# Patient Record
Sex: FEMALE | Employment: FULL TIME | URBAN - METROPOLITAN AREA
[De-identification: names, ages, dates, MRNs, and addresses within clinical notes are randomized per-mention and may not be internally consistent; named-entity substitution may affect disease eponyms.]

---

## 2017-01-11 ENCOUNTER — DOCUMENTATION ONLY (OUTPATIENT)
Dept: ORTHOPEDIC SURGERY | Age: 57
End: 2017-01-11

## 2017-02-03 ENCOUNTER — OFFICE VISIT (OUTPATIENT)
Dept: ORTHOPEDIC SURGERY | Age: 57
End: 2017-02-03

## 2017-02-03 VITALS
BODY MASS INDEX: 26.06 KG/M2 | DIASTOLIC BLOOD PRESSURE: 73 MMHG | HEIGHT: 67 IN | HEART RATE: 77 BPM | WEIGHT: 166 LBS | TEMPERATURE: 95.6 F | SYSTOLIC BLOOD PRESSURE: 112 MMHG

## 2017-02-03 DIAGNOSIS — R20.0 NUMBNESS OF LEGS: Primary | ICD-10-CM

## 2017-02-03 DIAGNOSIS — R20.0 NUMBNESS OF LEGS: ICD-10-CM

## 2017-02-03 NOTE — PROGRESS NOTES
[address] [place] [address]  [phone]           Patient: Praveen Castillo                MRN: [id number]       SSN: [id number]  YOB: 1960        AGE: 49 y.o. SEX: female  Body mass index is 26 kg/(m^2). PCP: Sarah Kent MD  02/03/17      This office note has been dictated. REVIEW OF SYSTEMS:  Constitutional: Negative for fever, chills, weight loss and malaise/fatigue. HENT: Negative. Eyes: Negative. Respiratory: Negative. Cardiovascular: Negative. Gastrointestinal: No bowel incontinence or constipation. Genitourinary: No bladder incontinence or saddle anesthesia. Skin: Negative. Neurological: Negative. Endo/Heme/Allergies: Negative. Psychiatric/Behavioral: Negative. Musculoskeletal: As per HPI above. No past medical history on file. Current Outpatient Prescriptions:     Calcium-Cholecalciferol, D3, 600 mg(1,500mg) -400 unit cap, Take  by mouth., Disp: , Rfl:     aspirin delayed-release 81 mg tablet, Take  by mouth daily. , Disp: , Rfl:     lisinopril (PRINIVIL, ZESTRIL) 10 mg tablet, Take  by mouth daily. , Disp: , Rfl:     naproxen (NAPROSYN) 500 mg tablet, Take 500 mg by mouth two (2) times daily (with meals). , Disp: , Rfl:     PV W-O ARTURO/FERROUS FUMARATE/FA (M-VIT PO), Take  by mouth., Disp: , Rfl:     loratadine (CLARITIN) 10 mg tablet, Take 10 mg by mouth., Disp: , Rfl:     meloxicam (MOBIC) 15 mg tablet, Take 1 Tab by mouth daily. , Disp: 30 Tab, Rfl: 0    No Known Allergies    Social History     Social History    Marital status:      Spouse name: N/A    Number of children: N/A    Years of education: N/A     Occupational History    Not on file.      Social History Main Topics    Smoking status: Never Smoker    Smokeless tobacco: Never Used    Alcohol use Yes      Comment: occasional    Drug use: No    Sexual activity: Not Currently     Other Topics Concern  Not on file     Social History Narrative       Past Surgical History   Procedure Laterality Date    Hx hip replacement Left 2012           We did see Ms. Keith Eaton for follow-up in regards to her right lower extremity. The patient does have continued numbness and occasional discomfort in regards to her right thigh. It is all located on the inner part of her thigh. We have done an MRI of her back, as well as her hip, which have essentially been negative. We sent her for an EMG. She presents today for reevaluation. The numbness is not affecting her function. She did have a week where she had pain associated to it, but that has diminished. There is no current pain just continual numbness of the right inner thigh. She has had no radiation of symptoms down the lower extremities. She has had no change in bowel or bladder habits. She has had no recent fevers or chills to report. PHYSICAL EXAMINATION: In general the patient is alert and oriented x 3 and is in no acute distress. The patient is well-developed and well-nourished with a normal affect. The patient is afebrile. HEENT:  Head is normocephalic and atraumatic. Pupils are equally round and reactive to light and accommodation. Extraocular eye movements are intact. Neck is supple. Trachea is midline. No JVD is present. Breathing is nonlabored. Examination of the lower extremities reveals pain free range of motion of the hips. There is no pain with palpation to the trochanteric bursae. There is negative straight leg raise. There is negative calf tenderness and swelling. There is negative Homans. There is no evidence of DVT noted. The right thigh does have some decreased sensation to the inner thigh. Strength is symmetric to the bilateral lower extremities. There is negative straight leg raise. EMG STUDY:  review of the EMG nerve conduction velocity study is essentially negative. ASSESSMENT:  Right lower extremity numbness.      PLAN: At this point we will move forward with obtaining some lab work to rule out numbness looking at Vitamin B12, folate, Lymes titer, celiac workup, and thyroid workup. We will see her back afterwards for review. As long as it is not getting worse we are going to follow it after that. We talked about referral to neurology for further testing and examination. We will see how she is doing upon her return.                     JR Merlin BRUNSON, PA-C, ATC

## 2018-12-13 ENCOUNTER — OFFICE VISIT (OUTPATIENT)
Dept: ORTHOPEDIC SURGERY | Age: 58
End: 2018-12-13

## 2018-12-13 VITALS
RESPIRATION RATE: 16 BRPM | WEIGHT: 171.6 LBS | DIASTOLIC BLOOD PRESSURE: 83 MMHG | BODY MASS INDEX: 26.93 KG/M2 | OXYGEN SATURATION: 100 % | HEART RATE: 75 BPM | SYSTOLIC BLOOD PRESSURE: 123 MMHG | TEMPERATURE: 97.6 F | HEIGHT: 67 IN

## 2018-12-13 DIAGNOSIS — M75.111 PARTIAL NONTRAUMATIC TEAR OF ROTATOR CUFF, RIGHT: Primary | ICD-10-CM

## 2018-12-13 DIAGNOSIS — M25.511 RIGHT SHOULDER PAIN, UNSPECIFIED CHRONICITY: ICD-10-CM

## 2018-12-13 RX ORDER — MELOXICAM 15 MG/1
15 TABLET ORAL
Qty: 30 TAB | Refills: 1 | Status: SHIPPED | OUTPATIENT
Start: 2018-12-13 | End: 2019-02-14 | Stop reason: SDUPTHER

## 2018-12-13 NOTE — PROGRESS NOTES
Michelle Mitchell  1960   Chief Complaint   Patient presents with    Shoulder Pain     right shoulder pain x 4 months         HISTORY OF PRESENT ILLNESS  Michelle Mitchell is a 66 y.o. female who presents today for evaluation of right shoulder pain. Pain in the worst in the lateral aspect. She rates her pain 2/10 today. Pain has been present for 4 months. Denies specific injury/trauma. She states she is a side sleeper and must have pulled her right arm across her and caused onset of pain. Her right shoulder pain is exacerbated with overhead activity and reaching behind. Symptoms are most severe at night. Patient describes the pain as aching, burning, and bend that is Constant in nature. Symptoms are worse with stretching, straigtening, bending, Activity and is better with  Rest. Associated symptoms include weakness and soreness. Since problem started, it: has worsened. Pain does wake patient up at night. Has taken no meds for the problem. She has been doing home exercises on her own for 8 weeks with no relief. Has tried following treatments: Injections:NO; Brace:NO; Therapy:NO; Cane/Crutch:NO       No Known Allergies     History reviewed. No pertinent past medical history.    Social History     Socioeconomic History    Marital status:      Spouse name: Not on file    Number of children: Not on file    Years of education: Not on file    Highest education level: Not on file   Social Needs    Financial resource strain: Not on file    Food insecurity - worry: Not on file    Food insecurity - inability: Not on file    Transportation needs - medical: Not on file   Priceline needs - non-medical: Not on file   Occupational History    Not on file   Tobacco Use    Smoking status: Never Smoker    Smokeless tobacco: Never Used   Substance and Sexual Activity    Alcohol use: Yes     Comment: occasional    Drug use: No    Sexual activity: Not Currently   Other Topics Concern    Not on file Social History Narrative    Not on file      Past Surgical History:   Procedure Laterality Date    HX HIP REPLACEMENT Left 2012      Family History   Problem Relation Age of Onset    Hypertension Mother     Heart Disease Mother     Hypertension Father     Heart Disease Father       Current Outpatient Medications   Medication Sig    Calcium-Cholecalciferol, D3, 600 mg(1,500mg) -400 unit cap Take  by mouth.  aspirin delayed-release 81 mg tablet Take  by mouth daily.  lisinopril (PRINIVIL, ZESTRIL) 10 mg tablet Take  by mouth daily.  PV W-O ARTURO/FERROUS FUMARATE/FA (M-VIT PO) Take  by mouth.  loratadine (CLARITIN) 10 mg tablet Take 10 mg by mouth.  naproxen (NAPROSYN) 500 mg tablet Take 500 mg by mouth two (2) times daily (with meals).  meloxicam (MOBIC) 15 mg tablet Take 1 Tab by mouth daily. No current facility-administered medications for this visit. REVIEW OF SYSTEM   Patient denies: Weight loss, Fever/Chills, HA, Visual changes, Fatigue, Chest pain, SOB, Abdominal pain, N/V/D/C, Blood in stool or urine, Edema. Pertinent positive as above in HPI. All others were negative    PHYSICAL EXAM:   Visit Vitals  /83   Pulse 75   Temp 97.6 °F (36.4 °C)   Resp 16   Ht 5' 7\" (1.702 m)   Wt 171 lb 9.6 oz (77.8 kg)   SpO2 100%   BMI 26.88 kg/m²     The patient is a well-developed, well-nourished female   in no acute distress. The patient is alert and oriented times three. The patient is alert and oriented times three. Mood and affect are normal.  LYMPHATIC: lymph nodes are not enlarged and are within normal limits  SKIN: normal in color and non tender to palpation. There are no bruises or abrasions noted. NEUROLOGICAL: Motor sensory exam is within normal limits. Reflexes are equal bilaterally.  There is normal sensation to pinprick and light touch  MUSCULOSKELETAL:  Examination Right shoulder   Skin Intact   AC joint tenderness -   Biceps tenderness -   Forward flexion/Elevation    Active abduction    Glenohumeral abduction 90   External rotation ROM 45   Internal rotation ROM 30   Apprehension -   Akhils Relocation -   Jerk -   Load and Shift -   Obriens -   Speeds -   Impingement sign +   Supraspinatus/Empty Can -, 5/5   External Rotation Strength -, 5/5   Lift Off/Belly Press -, 5/5   Neurovascular Intact     PROCEDURE: Ultrasound view of right shoulder revealed: negative    IMAGING: XR right shoulder dated 12/13/18 was reviewed and read: no acute abnormalities    IMPRESSION:      ICD-10-CM ICD-9-CM    1. Partial nontraumatic tear of rotator cuff, right M75.111 726.13 MRI SHOULDER RT WO CONT      meloxicam (MOBIC) 15 mg tablet   2. Right shoulder pain, unspecified chronicity M25.511 719.41 AMB POC XRAY, SHOULDER; COMPLETE, 2+      MRI SHOULDER RT WO CONT      US GUIDE INJ/ASP/ARTHRO LG JNT/BURSA      meloxicam (MOBIC) 15 mg tablet        PLAN:  1. The patient presents today with right shoulder pain due possibly to rotator cuff tear. I will order an MRI of the right shoulder for further evaluation. Risk factors include: n/a  2. Yes ultrasound exam indicated today: RT SHOULDER  3. No cortisone injection indicated today   4. No Physical/Occupational Therapy indicated today  5. Yes diagnostic test indicated today: MRI RT SHOULDER  6. No durable medical equipment indicated today  7. No referral indicated today   8. Yes medications indicated today: MOBIC  9. No Narcotic indicated today    RTC following MRI  Follow-up Disposition: Not on File    Scribed by Daynaa Crawford [facility]) as dictated by Hema Mack MD    I, Dr. Hema Mack, confirm that all documentation is accurate.     Hema Mack M.D.   [facility]

## 2018-12-13 NOTE — PROGRESS NOTES
1. Have you been to the ER, urgent care clinic since your last visit? Hospitalized since your last visit? No    2. Have you seen or consulted any other health care providers outside of the [address] [place] [address] since your last visit? Include any pap smears or colon screening.  No

## 2018-12-26 ENCOUNTER — TELEPHONE (OUTPATIENT)
Dept: ORTHOPEDIC SURGERY | Age: 58
End: 2018-12-26

## 2018-12-26 DIAGNOSIS — M75.111 PARTIAL NONTRAUMATIC TEAR OF ROTATOR CUFF, RIGHT: Primary | ICD-10-CM

## 2018-12-26 NOTE — TELEPHONE ENCOUNTER
Our request for a MRI of the Right Shoulder without contrast has been denied by [employer] on behalf of [facility] [place] . [employer] states our records do not show the patient failed to improve following a 6 week trial of treatment. Office note dated 18 was submitted as this was the only time the patient was seen for this issue. Advanced imaging is supported for this problem if the patient failed to improve following a recent (within 3 months) 6 week trail of doctor prescribed treatment and the patient had follow up contract with the doctor to look at the progress after 6 weeks. Follow up contact may be done by phone, mail or messaging. This treatment might include any of the followin) Rest, ice, wraps and/or propping up the affected body part, 2) drugs that treat swelling and/or pain, 3) Oral or injected steriods, 4) home workout program and/or formal in office workout program prescribed by the doctor, 5) cross training, 6) bracing, splinting and/or casting and 7)medications injected into the joint. If desired, a peer to peer review can be obtained by calling Monique/[employer] at [phone], [address]  Customer ID# [id number], Reference Code# [id number]. No close date was given as I believe this request has been closed based upon the denial. Thank you.

## 2019-01-11 ENCOUNTER — HOSPITAL ENCOUNTER (OUTPATIENT)
Age: 59
Discharge: HOME | End: 2019-01-11
Attending: ORTHOPAEDIC SURGERY
Payer: COMMERCIAL

## 2019-01-11 DIAGNOSIS — M75.111 PARTIAL NONTRAUMATIC TEAR OF ROTATOR CUFF, RIGHT: ICD-10-CM

## 2019-01-11 DIAGNOSIS — M25.511 RIGHT SHOULDER PAIN, UNSPECIFIED CHRONICITY: ICD-10-CM

## 2019-01-11 PROCEDURE — 73221 MRI JOINT UPR EXTREM W/O DYE: CPT

## 2019-01-17 ENCOUNTER — OFFICE VISIT (OUTPATIENT)
Dept: ORTHOPEDIC SURGERY | Age: 59
End: 2019-01-17

## 2019-01-17 VITALS
OXYGEN SATURATION: 100 % | DIASTOLIC BLOOD PRESSURE: 83 MMHG | BODY MASS INDEX: 27.25 KG/M2 | RESPIRATION RATE: 16 BRPM | SYSTOLIC BLOOD PRESSURE: 140 MMHG | TEMPERATURE: 97.3 F | HEART RATE: 66 BPM | WEIGHT: 173.6 LBS | HEIGHT: 67 IN

## 2019-01-17 DIAGNOSIS — M75.01 ADHESIVE CAPSULITIS OF RIGHT SHOULDER: Primary | ICD-10-CM

## 2019-01-17 NOTE — PROGRESS NOTES
Glennallen Garden 
1960 Chief Complaint Patient presents with  Shoulder Pain  
  right shoulder pain; MRI follow up HISTORY OF PRESENT ILLNESS Trevin Dewitt is a 66 y.o. female who presents today for reevaluation of right shoulder pain and to review MRI. Patient rates pain as 2/10 today. Pain has been present for 4 months. Denies specific injury/trauma. She states she is a side sleeper and must have pulled her right arm across her and caused onset of pain. Her right shoulder pain is exacerbated with overhead activity and reaching behind. Patient denies any fever, chills, chest pain, shortness of breath or calf pain. The remainder of the review of systems is negative. There are no new illness or injuries to report since last seen in the office. There are no changes to medications, allergies, family or social history. PHYSICAL EXAM:  
Visit Vitals /83 Pulse 66 Temp 97.3 °F (36.3 °C) Resp 16 Ht 5' 7\" (1.702 m) Wt 173 lb 9.6 oz (78.7 kg) SpO2 100% BMI 27.19 kg/m² The patient is a well-developed, well-nourished female   in no acute distress. The patient is alert and oriented times three. The patient is alert and oriented times three. Mood and affect are normal. 
LYMPHATIC: lymph nodes are not enlarged and are within normal limits SKIN: normal in color and non tender to palpation. There are no bruises or abrasions noted. NEUROLOGICAL: Motor sensory exam is within normal limits. Reflexes are equal bilaterally. There is normal sensation to pinprick and light touch MUSCULOSKELETAL: 
Examination Right shoulder Skin Intact AC joint tenderness - Biceps tenderness - Forward flexion/Elevation  Active abduction  Glenohumeral abduction 90 External rotation ROM 45 Internal rotation ROM 30 Apprehension -  
[place]incoln Relocation -  
Jerk - Load and Shift -  
Larisa Lapine - Speeds - Impingement sign + Supraspinatus/Empty Can -, 5/5  
 External Rotation Strength -, 5/5 Lift Off/Belly Press -, 5/5 Neurovascular Intact IMAGING: MRI of right shoulder dated 1/11/19 was reviewed and read:  
IMPRESSION: 
1. There is at least severe tendinosis with interstitial tearing involving the distal supraspinatus. Some undersurface and bursal surface fibrillation/tearing is possible but not convincingly so. There is no tendon retraction and there is 
no muscle atrophy.   
2. Possibility of nondisplaced superior anterior labral tear, versus anatomic variant.  
 3. Moderately pronounced AC joint arthritis with minimal bursitis.  
 4. Edema within the axillary recess. Though nonspecific, can be associated with adhesive capsulitis. XR right shoulder dated 12/13/18 was reviewed and read: no acute abnormalities IMPRESSION:   
  ICD-10-CM ICD-9-CM 1. Adhesive capsulitis of right shoulder M75.01 726.0 REFERRAL TO PHYSICAL THERAPY PLAN:  
1. The patient presents today with right shoulder pain due to adhesive capsulitis and I would like her to begin PT. Risk factors include: n/a 2. No ultrasound exam indicated today 3. No cortisone injection indicated today 4. Yes Physical Therapy indicated today 5. No diagnostic test indicated today: 6. No durable medical equipment indicated today 7. No referral indicated today 8. No medications indicated today: 9. No Narcotic indicated today RTC 3 weeks Follow-up Disposition: Not on File Scribed by Bishop Peguero ([address]) as dictated by Alec Vivas MD 
 
I, Dr. Alec Vivas, confirm that all documentation is accurate.  
 
Alec Vivas M.D.  
[facility]

## 2019-01-17 NOTE — PROGRESS NOTES
1. Have you been to the ER, urgent care clinic since your last visit? Hospitalized since your last visit? No 
 
2. Have you seen or consulted any other health care providers outside of the [address] [place] [address] since your last visit? Include any pap smears or colon screening.  No

## 2019-01-28 ENCOUNTER — HOSPITAL ENCOUNTER (OUTPATIENT)
Dept: PHYSICAL THERAPY | Age: 59
Discharge: HOME | End: 2019-01-28
Payer: COMMERCIAL

## 2019-01-28 PROCEDURE — 97110 THERAPEUTIC EXERCISES: CPT | Performed by: PHYSICAL THERAPIST

## 2019-01-28 PROCEDURE — 97162 PT EVAL MOD COMPLEX 30 MIN: CPT | Performed by: PHYSICAL THERAPIST

## 2019-01-28 PROCEDURE — 97140 MANUAL THERAPY 1/> REGIONS: CPT | Performed by: PHYSICAL THERAPIST

## 2019-01-28 NOTE — PROGRESS NOTES
PT DAILY TREATMENT NOTE 10-18 Patient Name: Hasmukh Gray Date:2019 : 1960 [x]  Patient  Verified Payor: Jade Baird / Plan: Yessy Mcgee PPO / Product Type: PPO / In time:9:55  Out time:10:50 Total Treatment Time (min): 55 Visit #: 1 of 18 Medicare/BCBS Only Total Timed Codes (min):  35 1:1 Treatment Time:  35 Treatment Area: Adhesive capsulitis of right shoulder [M75.01] SUBJECTIVE Pain Level (0-10 scale): 0-1/10 Any medication changes, allergies to medications, adverse drug reactions, diagnosis change, or new procedure performed?: [x] No    [] Yes (see summary sheet for update) Subjective functional status/changes:   [] No changes reported See Evaluation. OBJECTIVE 20 min []Eval                  []Re-Eval  
 
 
10 min Manual Therapy:  GH/ST joint mobs in supine and sidelying to increase elevation and rotation, manual stretching Rationale: increase ROM and increase tissue extensibility to increase ease of motion to improve function. With 
 [] TE 
 [] TA 
 [] neuro 
 [] other: Patient Education: [x] Review HEP [] Progressed/Changed HEP based on:  
[] positioning   [] body mechanics   [] transfers   [] heat/ice application   
[] other:   
 
Other Objective/Functional Measures: See Evaluation. Pain Level (0-10 scale) post treatment: 0/10 ASSESSMENT/Changes in Function: Patient with signs and symptoms consistent with right shoulder pain due to adhesive capsulitis. Shoulder heights are level, neck is midline. AROM of the right shoulder is 0-138 degrees; abduction 0-90; IR 0-26; ER 0--20 (measured after TE and manual therapy). MMT is 4/5 for flex, ER and scaption in her available ROM; IR is 3/5 with pain.  (+) Husain-Kumar test, (+) Cross Arm Test, (+) posterior capsule tightness, (+) Speed Test.  (-) Delano's test, (-) Sulcus sign. She has an abnormal scapulohumeral rhythm on the right.   Palpation elicited tenderness over the lateral upper arm. Patient will continue to benefit from skilled PT services to modify and progress therapeutic interventions, address functional mobility deficits, address ROM deficits, address strength deficits, analyze and address soft tissue restrictions and analyze and cue movement patterns to attain remaining goals. [x]  See Plan of Care 
[]  See progress note/recertification 
[]  See Discharge Summary Progress towards goals / Updated goals: 
Short Term Goals: To be accomplished in 1 weeks: 1. Patient will become proficient in her HEP and will be compliant in performing that program. 
Evaluation:  Patient given a written/illustrated HEP. Long Term Goals: To be accomplished in 6 weeks: 1. Patient's pain level will be 1-2/10 with activity in order to improve patient's ability to perform normal ADLs. Evaluation:  Pain 0-8/10 2. Patient will demonstrate 0-170 flexion; 0-150 abduction; 0-70 IR; 0-70 ER AROM right shoulder to increase ease of ADLs. Evaluation:  PROM flexion 0-138; abduction 0-90; IR 0-26; ER 0-20. 3. Patient will increase FOTO score to 70 to increase functional mobility. Evaluation:  58 
4. Patient will be able to lift and put her carry on luggage into an overhead bin on an airplane order to perform normal ADLs. Evaluation:  Unable to lift up her carryon and has to check her bags. PLAN [x]  Upgrade activities as tolerated     [x]  Continue plan of care 
[]  Update interventions per flow sheet      
[]  Discharge due to:_ 
[]  Other:_ Diego Maddox, PT 1/28/2019  10:34 AM 
 
Future Appointments Date Time Provider Andre Sibley 2/7/2019 11:30 AM Ana Maria Monreal MD [facility] [address]

## 2019-01-28 NOTE — PROGRESS NOTES
[facility] [address] [facility] [place], [address]  
[phone] fax Plan of Care/ Statement of Necessity for Physical Therapy Services Patient name: Cecilia Alfredo Start of Care: 2019 Referral source: Jasonchristina Kelsy,* : 1960 Medical Diagnosis: Adhesive capsulitis of right shoulder [M75.01] Payor: Meredith Angel / Plan: [address] PPO / Product Type: PPO /  Onset Date:2018 Treatment Diagnosis: Right shoulder pain with limited ROM. Prior Hospitalization: see medical history Provider#: [id number] Medications: Verified on Patient summary List  
 Comorbidities: Arthritis, HBP. Prior Level of Function: Independent, traveled a lot for work. The Plan of Care and following information is based on the information from the initial evaluation. Assessment/ key information: Patient with signs and symptoms consistent with right shoulder pain due to adhesive capsulitis. Shoulder heights are level, neck is midline. AROM of the right shoulder is 0-138 degrees; abduction 0-90; IR 0-26; ER 0--20 (measured after TE and manual therapy). MMT is 4/5 for flex, ER and scaption in her available ROM; IR is 3/5 with pain.  (+) Husain-Kumar test, (+) Cross Arm Test, (+) posterior capsule tightness, (+) Speed Test.  (-) Essex's test, (-) Sulcus sign. She has an abnormal scapulohumeral rhythm on the right. Palpation elicited tenderness over the lateral upper arm. Patient will benefit from a program of skilled physical therapy to include therapeutic exercises to address strength deficits, therapeutic activities to improve functional mobility, neuromuscular reeducation to address balance, coordination and proprioception, manual therapy to address ROM and tissue extensibility and modalities as indicated. All questions were answered.  
 
Evaluation Complexity History MEDIUM  Complexity : 1-2 comorbidities / personal factors will impact the outcome/ POC ; Examination MEDIUM Complexity : 3 Standardized tests and measures addressing body structure, function, activity limitation and / or participation in recreation  ;Presentation MEDIUM Complexity : Evolving with changing characteristics  ; Clinical Decision Making MEDIUM Complexity : FOTO score of 26-74 Overall Complexity Rating: MEDIUM Problem List: pain affecting function, decrease ROM, decrease strength, decrease ADL/ functional abilitiies, decrease activity tolerance and decrease flexibility/ joint mobility Treatment Plan may include any combination of the following: Therapeutic exercise, Therapeutic activities, Neuromuscular re-education, Physical agent/modality and Manual therapy Patient / Family readiness to learn indicated by: asking questions, trying to perform skills and interest 
Persons(s) to be included in education: patient (P) Barriers to Learning/Limitations: None Patient Goal (s): greater ROM; my strength back; alleviate the pain Patient Self Reported Health Status: good Rehabilitation Potential: good Short Term Goals: To be accomplished in 1 weeks: 1. Patient will become proficient in her HEP and will be compliant in performing that program. 
 
Long Term Goals: To be accomplished in 6 weeks: 1. Patient's pain level will be 1-2/10 with activity in order to improve patient's ability to perform normal ADLs. 2. Patient will demonstrate 0-170 flexion; 0-150 abduction; 0-70 IR; 0-70 ER AROM right shoulder to increase ease of ADLs. 3. Patient will increase FOTO score to 70 to increase functional mobility. 4. Patient will be able to lift and put her carry on luggage into an overhead bin on an airplane order to perform normal ADLs. Frequency / Duration: Patient to be seen 2-3 times per week for 6 weeks. Patient/ Caregiver education and instruction: Diagnosis, prognosis, exercises.  
 [x]  Plan of care has been reviewed with PTA 
 
 Mari Emery, PT 1/28/2019 10:32 AM 
________________________________________________________________________ I certify that the above Therapy Services are being furnished while the patient is under my care. I agree with the treatment plan and certify that this therapy is necessary. [de-identified] Signature:____________Date:_________TIME:________ 
 
[employer], Date and Time must be completed for valid certification ** Please sign and return to [facility] [address] [facility] [place], [address]  
[phone] fax

## 2019-01-31 ENCOUNTER — HOSPITAL ENCOUNTER (OUTPATIENT)
Dept: PHYSICAL THERAPY | Age: 59
Discharge: HOME | End: 2019-01-31
Payer: COMMERCIAL

## 2019-01-31 PROCEDURE — 97140 MANUAL THERAPY 1/> REGIONS: CPT

## 2019-01-31 PROCEDURE — 97110 THERAPEUTIC EXERCISES: CPT

## 2019-01-31 NOTE — PROGRESS NOTES
PT DAILY TREATMENT NOTE 10-18 Patient Name: Hal Yan Date:2019 : 1960 [x]  Patient  Verified Payor: [employer] / Plan: [employer] PPO / Product Type: PPO / In time:332  Out time:420 Total Treatment Time (min): 48 Visit #: 2 of 18 Medicare/BCBS Only Total Timed Codes (min):  38 1:1 Treatment Time:  45 Treatment Area: Adhesive capsulitis of right shoulder [M75.01] SUBJECTIVE Pain Level (0-10 scale): 0-1 Any medication changes, allergies to medications, adverse drug reactions, diagnosis change, or new procedure performed?: [x] No    [] Yes (see summary sheet for update) Subjective functional status/changes:   [] No changes reported Pt reports initiation of HEP. Has little complaints of pain OBJECTIVE Modality rationale: decrease pain to improve the patients ability to increase ease with ADLs Min Type Additional Details  
 [] Estim:  []Unatt       []IFC  []Premod []Other:  []w/ice   []w/heat Position: Location:  
 [] Estim: []Att    []TENS instruct  []NMES []Other:  []w/US   []w/ice   []w/heat Position: Location:  
 []  Traction: [] Cervical       []Lumbar 
                     [] Prone          []Supine []Intermittent   []Continuous Lbs: 
[] before manual 
[] after manual  
 []  Ultrasound: []Continuous   [] Pulsed []1MHz   []3MHz W/cm2: 
Location:  
 []  Iontophoresis with dexamethasone Location: [] Take home patch  
[] In clinic  
10 []  Ice     [x]  heat 
[]  Ice massage 
[]  Laser  
[]  Anodyne Position:sitting Location:right shoulder   
 []  Laser with stim 
[]  Other:  Position: Location:  
 []  Vasopneumatic Device Pressure:       [] lo [] med [] hi  
Temperature: [] lo [] med [] hi  
[] Skin assessment post-treatment:  []intact []redness- no adverse reaction 
  []redness  adverse reaction:  
 
 
28 min Therapeutic Exercise:  [x] See flow sheet :  
 Rationale: increase ROM and increase strength to improve the patients ability to increase ease with self care tasks 10 min Manual Therapy:  Shoulder PROM in all directions,  Inferior and posterior joint mobs Rationale: decrease pain, increase ROM and increase tissue extensibility to increase ease with overhead reaching With 
 [] TE 
 [] TA 
 [] neuro 
 [] other: Patient Education: [x] Review HEP [] Progressed/Changed HEP based on:  
[] positioning   [] body mechanics   [] transfers   [] heat/ice application   
[] other:   
 
 
Pain Level (0-10 scale) post treatment: 0-1 ASSESSMENT/Changes in Function: Initiated exercise per POC. Pt reports less stiffness following today's treatment. Patient will continue to benefit from skilled PT services to modify and progress therapeutic interventions, address functional mobility deficits, address ROM deficits, address strength deficits, analyze and address soft tissue restrictions, analyze and cue movement patterns and analyze and modify body mechanics/ergonomics to attain remaining goals. [x]  See Plan of Care 
[]  See progress note/recertification 
[]  See Discharge Summary Progress towards goals / Updated goals: 
Short Term Goals: To be accomplished in 1 weeks: 1.  Patient will become proficient in her HEP and will be compliant in performing that program. 
Evaluation:  Patient given a written/illustrated HEP. Long Term Goals: To be accomplished in 6 weeks: 1. Patient's pain level will be 1-2/10 with activity in order to improve patient's ability to perform normal ADLs. Evaluation:  Pain 0-8/10 2. Patient will demonstrate 0-170 flexion; 0-150 abduction; 0-70 IR; 0-70 ER AROM right shoulder to increase ease of ADLs. Evaluation:  PROM flexion 0-138; abduction 0-90; IR 0-26; ER 0-20. 3. Patient will increase FOTO score to 70 to increase functional mobility.  
Evaluation:  58 
 4. Patient will be able to lift and put her carry on luggage into an overhead bin on an airplane order to perform normal ADLs. Evaluation:  Unable to lift up her carryon and has to check her bags. PLAN [x]  Upgrade activities as tolerated     [x]  Continue plan of care 
[]  Update interventions per flow sheet      
[]  Discharge due to:_ 
[]  Other:_ Fili Triana 1/31/2019  3:21 PM 
 
Future Appointments Date Time Provider Andre Maryjo 1/31/2019  3:30 PM Hunter Workman [facility]  
2/1/2019 12:00 PM Hunter Workman [facility]  
2/7/2019  5:30 PM Chanel Jean, PT [facility]  
2/8/2019  3:30 PM Yandy Barr, PT [facility]  
2/11/2019  5:00 PM Yandy Barr, PT [facility]  
2/13/2019  5:00 PM Chanel Jean, PT [facility]  
2/14/2019  8:00 AM Herrera Vivas MD [place] [id number]  
2/15/2019  4:00 PM Concepcion Workman [id number]  
2/18/2019  5:00 PM Yandy Barr, PT [facility]  
2/20/2019  5:00 PM Netta Boyer[address] [facility]  
2/22/2019  4:00 PM Hunter Workman [facility]  
2/27/2019  5:00 PM Chanel Jean, PT [facility]  
3/1/2019  4:00 PM Hunter Workman [facility]  
3/4/2019  5:30 PM Yandy Barr, PT [facility]  
3/6/2019  5:30 PM Hunter Workman [facility]  
3/8/2019  4:00 PM Concepcion Workman [facility]

## 2019-02-01 ENCOUNTER — HOSPITAL ENCOUNTER (OUTPATIENT)
Dept: PHYSICAL THERAPY | Age: 59
Discharge: HOME | End: 2019-02-01
Payer: COMMERCIAL

## 2019-02-01 PROCEDURE — 97140 MANUAL THERAPY 1/> REGIONS: CPT

## 2019-02-01 PROCEDURE — 97110 THERAPEUTIC EXERCISES: CPT

## 2019-02-01 NOTE — PROGRESS NOTES
PT DAILY TREATMENT NOTE 10-18 Patient Name: Elie Essex Date:2019 : 1960 [x]  Patient  Verified Payor: Maryjane Currie / Plan: Alla Dumont PPO / Product Type: PPO / In time: 1159 Out time:1250 Total Treatment Time (min): 51 Visit #: 3 of 18 Medicare/BCBS Only Total Timed Codes (min):  41 1:1 Treatment Time:  41  
 
 
Treatment Area: Adhesive capsulitis of right shoulder [M75.01] SUBJECTIVE Pain Level (0-10 scale): 2 Any medication changes, allergies to medications, adverse drug reactions, diagnosis change, or new procedure performed?: [x] No    [] Yes (see summary sheet for update) Subjective functional status/changes:   [] No changes reported Pt reports mild soreness in her shoulder today. OBJECTIVE Modality rationale: decrease pain and increase tissue extensibility to improve the patients ability to increase ease with sleep Min Type Additional Details  
 [] Estim:  []Unatt       []IFC  []Premod []Other:  []w/ice   []w/heat Position: Location:  
 [] Estim: []Att    []TENS instruct  []NMES []Other:  []w/US   []w/ice   []w/heat Position: Location:  
 []  Traction: [] Cervical       []Lumbar 
                     [] Prone          []Supine []Intermittent   []Continuous Lbs: 
[] before manual 
[] after manual  
 []  Ultrasound: []Continuous   [] Pulsed []1MHz   []3MHz W/cm2: 
Location:  
 []  Iontophoresis with dexamethasone Location: [] Take home patch  
[] In clinic  
10 []  Ice     [x]  heat 
[]  Ice massage 
[]  Laser  
[]  Anodyne Position:sitting Location:right shoulder  
 []  Laser with stim 
[]  Other:  Position: Location:  
 []  Vasopneumatic Device Pressure:       [] lo [] med [] hi  
Temperature: [] lo [] med [] hi  
[] Skin assessment post-treatment:  []intact []redness- no adverse reaction 
  []redness  adverse reaction: 31 min Therapeutic Exercise:  [x] See flow sheet :  
Rationale: increase ROM and increase strength to improve the patients ability to increase ease with self care tasks 10 min Manual Therapy:  Shoulder PROM in all directions,  Inferior and posterior joint mobs, STM to pecs and LHB Rationale: decrease pain, increase ROM, increase tissue extensibility and decrease trigger points to increase ease with overhead reaching With 
 [] TE 
 [] TA 
 [] neuro 
 [] other: Patient Education: [x] Review HEP [] Progressed/Changed HEP based on:  
[] positioning   [] body mechanics   [] transfers   [] heat/ice application   
[] other:   
 
  
 
Pain Level (0-10 scale) post treatment: 0 
 
ASSESSMENT/Changes in Function: Held on exercises progression due to pt having complaints of soreness. Pt with complaints of pain with passive IR. Patient will continue to benefit from skilled PT services to modify and progress therapeutic interventions, address functional mobility deficits, address ROM deficits, address strength deficits, analyze and address soft tissue restrictions, analyze and cue movement patterns, analyze and modify body mechanics/ergonomics, assess and modify postural abnormalities and instruct in home and community integration to attain remaining goals. [x]  See Plan of Care 
[]  See progress note/recertification 
[]  See Discharge Summary Progress towards goals / Updated goals: 
Short Term Goals: To be accomplished in 1 weeks: 1.  Patient will become proficient in her HEP and will be compliant in performing that program. 
Evaluation:  Patient given a written/illustrated HEP. 
  
Long Term Goals: To be accomplished in 6 weeks: 1. Patient's pain level will be 1-2/10 with activity in order to improve patient's ability to perform normal ADLs. Evaluation:  Pain 0-8/10 2. Patient will demonstrate 0-170 flexion; 0-150 abduction; 0-70 IR; 0-70 ER AROM right shoulder to increase ease of ADLs. Evaluation:  PROM flexion 0-138; abduction 0-90; IR 0-26; ER 0-20. 3. Patient will increase FOTO score to 70 to increase functional mobility. Evaluation:  58 
4. Patient will be able to lift and put her carry on luggage into an overhead bin on an airplane order to perform normal ADLs. Evaluation:  Unable to lift up her carryon and has to check her bags. 
  
 
 
 
PLAN [x]  Upgrade activities as tolerated     [x]  Continue plan of care 
[]  Update interventions per flow sheet      
[]  Discharge due to:_ 
[]  Other:_ Carrington Vigil 2/1/2019  12:22 PM 
 
Future Appointments Date Time Provider Andre Sibley 2/7/2019  5:30 PM Kemar Nelson [facility]  
2/8/2019  3:30 PM Roxy Minaya PT [facility]  
2/11/2019  5:00 PM Roxy Minaya PT [facility]  
2/13/2019  5:00 PM Kelsey Boyer [facility]  
2/14/2019  8:00 AM Dylan Alejandro MD [place] [id number]  
2/15/2019  4:00 PM Devoria Diesel [facility]  
2/18/2019  5:00 PM Roxy Minaya PT [facility]  
2/20/2019  5:00 PM Kelsey Boyer [address]  [facility]  
2/22/2019  4:00 PM Devoria Diesel [facility]  
2/27/2019  5:00 PM Zaria Marcano PT [facility]  
3/1/2019  4:00 PM Devoria Diesel [facility]  
3/4/2019  5:30 PM Roxy Minaya PT [facility]  
3/6/2019  5:30 PM Devoria Diesel [facility]  
3/8/2019  4:00 PM Vikash Kwan [facility]

## 2019-02-07 ENCOUNTER — HOSPITAL ENCOUNTER (OUTPATIENT)
Dept: PHYSICAL THERAPY | Age: 59
Discharge: HOME | End: 2019-02-07
Payer: COMMERCIAL

## 2019-02-07 PROCEDURE — 97140 MANUAL THERAPY 1/> REGIONS: CPT

## 2019-02-07 PROCEDURE — 97110 THERAPEUTIC EXERCISES: CPT

## 2019-02-07 NOTE — PROGRESS NOTES
PT DAILY TREATMENT NOTE 10-18 Patient Name: Bakari Vasquez Date:2019 : 1960 [x]  Patient  Verified Payor: Anu Myers / Plan: [address] HMO / Product Type: HMO /   
In time:525  Out time:620 Total Treatment Time (min): 55 Visit #: 4 of 18 Medicare/BCBS Only Total Timed Codes (min):  45 1:1 Treatment Time:  40 Treatment Area: Adhesive capsulitis of right shoulder [M75.01] SUBJECTIVE Pain Level (0-10 scale): 3-4 Any medication changes, allergies to medications, adverse drug reactions, diagnosis change, or new procedure performed?: [x] No    [] Yes (see summary sheet for update) Subjective functional status/changes:   [] No changes reported Patient reports slight increase in pain secondary to having to travel out of town earlier this week with patient secondarily required to tote her luggage. OBJECTIVE Modality rationale: decrease pain and increase tissue extensibility to improve the patients ability to improve ease with sleep Min Type Additional Details 10 []  Ice     [x]  heat 
[]  Ice massage Position: Reclined Location: Right Shoulder, Post-Tx 35 min Therapeutic Exercise:  [x] See flow sheet : Emphasis placed on improving available shoulder AROM and strength Rationale: increase ROM and increase strength to improve the patients ability to improve ease with overhead lifting 10 min Manual Therapy:   
Supine, Right GH Lateral Grade II-III Mobilization (OPP) Supine, Right GH Inferior Grade II-III Mobilization (OPP, inc deg abd) Supine, Right GH AP Grade II-III Mobilization (OPP) Supine, Right Shoulder Passive Physiological Mobilization - Flex/Abd/Scaption Rationale: decrease pain, increase ROM and increase tissue extensibility to improve ease with work-related ADLs. With 
 [] TE 
 [] TA 
 [] neuro 
 [] other: Patient Education: [x] Review HEP [] Progressed/Changed HEP based on:  
[] positioning   [] body mechanics   [] transfers   [] heat/ice application   
[] other:   
 
Pain Level (0-10 scale) post treatment: 0 
 
ASSESSMENT/Changes in Function: Patient with limited improvement in available shoulder ROM but question correlation to limited attendance to within clinic treatment due to personal time constraints. Further progressed shoulder AROM exercises to promote improved functional mobility. Patient will continue to benefit from skilled PT services to modify and progress therapeutic interventions, address functional mobility deficits, address ROM deficits, address strength deficits, analyze and address soft tissue restrictions, analyze and cue movement patterns, analyze and modify body mechanics/ergonomics and assess and modify postural abnormalities to attain remaining goals. []  See Plan of Care 
[]  See progress note/recertification 
[]  See Discharge Summary Progress towards goals / Updated goals: 
 
Short Term Goals: To be accomplished in 1 weeks: 1.  Patient will become proficient in her HEP and will be compliant in performing that program. 
Evaluation:  Patient given a written/illustrated HEP. Current: Met, HEP performance reported as prescribed, 2/7/2019 
  
Long Term Goals: To be accomplished in 6 weeks: 1. Patient's pain level will be 1-2/10 with activity in order to improve patient's ability to perform normal ADLs. Evaluation:  Pain 0-8/10 2. Patient will demonstrate 0-170 flexion; 0-150 abduction; 0-70 IR; 0-70 ER AROM right shoulder to increase ease of ADLs. Evaluation:  PROM flexion 0-138; abduction 0-90; IR 0-26; ER 0-20. Current: Progressing, Flexion 0-120, Abduction 0-100, Functional IR Left Iliac, Functional ER Occiput, 2/7/2019 3. Patient will increase FOTO score to 70 to increase functional mobility. Evaluation: FOTO = 58 
4. Patient will be able to lift and put her carry on luggage into an overhead bin on an airplane order to perform normal ADLs. Evaluation:  Unable to lift up her carryon and has to check her bags. PLAN [x]  Upgrade activities as tolerated     [x]  Continue plan of care 
[]  Update interventions per flow sheet      
[]  Discharge due to:_ 
[]  Other:_   
 
Marline Hernandez, PT 2/7/2019  12:41 PM 
 
Future Appointments Date Time Provider Andre Maryjo 2/7/2019  5:30 PM Boyer, [address] [facility]  
2/11/2019  5:00 PM Bharat Valentine, PT [facility]  
2/13/2019  5:00 PM Elsy Meter, PT [facility]  
2/14/2019  8:00 AM Dima Fletcher MD [place] [id number]  
2/15/2019  4:00 PM Verna Sophia [facility]  
2/18/2019  5:00 PM Bharat Valentine, PT [facility]  
2/20/2019  5:00 PM Boeyr, [address] [facility]  
2/22/2019  4:00 PM Verna Cortes [facility]  
2/27/2019  5:00 PM Elsy Meter, PT [facility]  
3/1/2019  4:00 PM Vernarashaad Cortes [facility]  
3/4/2019  5:30 PM Bharat Valentine, PT [facility]  
3/6/2019  5:30 PM Vernarashaad Cortes [facility]  
3/8/2019  4:00 PM Verna Sophia [facility]

## 2019-02-08 ENCOUNTER — APPOINTMENT (OUTPATIENT)
Dept: PHYSICAL THERAPY | Age: 59
End: 2019-02-08
Payer: COMMERCIAL

## 2019-02-11 ENCOUNTER — HOSPITAL ENCOUNTER (OUTPATIENT)
Dept: PHYSICAL THERAPY | Age: 59
Discharge: HOME | End: 2019-02-11
Payer: COMMERCIAL

## 2019-02-11 PROCEDURE — 97110 THERAPEUTIC EXERCISES: CPT | Performed by: PHYSICAL THERAPIST

## 2019-02-11 PROCEDURE — 97140 MANUAL THERAPY 1/> REGIONS: CPT | Performed by: PHYSICAL THERAPIST

## 2019-02-13 ENCOUNTER — HOSPITAL ENCOUNTER (OUTPATIENT)
Dept: PHYSICAL THERAPY | Age: 59
Discharge: HOME | End: 2019-02-13
Payer: COMMERCIAL

## 2019-02-13 PROCEDURE — 97140 MANUAL THERAPY 1/> REGIONS: CPT

## 2019-02-13 PROCEDURE — 97110 THERAPEUTIC EXERCISES: CPT

## 2019-02-13 NOTE — PROGRESS NOTES
PT DAILY TREATMENT NOTE 10-18 Patient Name: Ada Watters Date:2019 : 1960 [x]  Patient  Verified Payor: Tiffany Germain / Plan: [employer] PPO / Product Type: PPO / In time:450  Out time:553 Total Treatment Time (min): 63 Visit #: 6 of 18 Medicare/BCBS Only Total Timed Codes (min):  53 1:1 Treatment Time:  48 Treatment Area: Adhesive capsulitis of right shoulder [M75.01] SUBJECTIVE Pain Level (0-10 scale): 1 Any medication changes, allergies to medications, adverse drug reactions, diagnosis change, or new procedure performed?: [x] No    [] Yes (see summary sheet for update) Subjective functional status/changes:   [] No changes reported Patient reports self-perception in improvement in right shoulder mobility. OBJECTIVE Modality rationale: decrease pain and increase tissue extensibility to improve the patients ability to improve ease with sleep Min Type Additional Details 10 []  Ice     [x]  heat 
[]  Ice massage Position: Reclined Location: Right Shoulder, Post-tx 43 min Therapeutic Exercise:  [x] See flow sheet : Emphasis placed on improving available shoulder AROM and strength Rationale: increase ROM and increase strength to improve the patients ability to improve ease with overhead lifting 
  
10 min Manual Therapy:   
Supine, Right GH Lateral Grade II-III Mobilization (OPP) Supine, Right GH Inferior Grade II-III Mobilization (OPP, inc deg abd) Supine, Right GH AP Grade II-III Mobilization (OPP) Supine, Right Shoulder Passive Physiological Mobilization - Flex/Abd/Scaption Rationale: decrease pain, increase ROM and increase tissue extensibility to improve ease with work-related ADLs. With 
 [] TE 
 [] TA 
 [] neuro 
 [] other: Patient Education: [x] Review HEP [] Progressed/Changed HEP based on:  
[] positioning   [] body mechanics   [] transfers   [] heat/ice application   
[] other:   
 
Pain Level (0-10 scale) post treatment: 0 
 
 ASSESSMENT/Changes in Function: Patient demonstrates right shoulder AROM as follows: Flexion 145 deg, Abduction 105 deg, IR (90 deg abd) 25 deg, ER (90 deg abd) 30 deg. Patient has been slightly limited in progression since initiation of SoC secondary to work-related travel with patient now with the ability to schedule at frequency 3x/week. With improvements in available shoulder ROM have further progressed strengthening into increasing degrees of elevation to improve ease with functional ADLs. Patient will continue to benefit from skilled PT services to modify and progress therapeutic interventions, address functional mobility deficits, address ROM deficits, address strength deficits, analyze and address soft tissue restrictions, analyze and cue movement patterns and analyze and modify body mechanics/ergonomics to attain remaining goals. []  See Plan of Care [x]  See progress note/recertification 
[]  See Discharge Summary Progress towards goals / Updated goals: 
 
Short Term Goals: To be accomplished in 1 weeks: 1.  Patient will become proficient in her HEP and will be compliant in performing that program. 
Evaluation:  Patient given a written/illustrated HEP. Current: Met, HEP performance reported as prescribed, 2/7/2019 
  
Long Term Goals: To be accomplished in 6 weeks: 1. Patient's pain level will be 1-2/10 with activity in order to improve patient's ability to perform normal ADLs. Evaluation:  Pain 0-8/10 Current: Remains, Pain Levels = 0/10 - 8/10. 2/11/19. 
2. Patient will demonstrate 0-170 flexion; 0-150 abduction; 0-70 IR; 0-70 ER AROM right shoulder to increase ease of ADLs. Evaluation:  PROM flexion 0-138; abduction 0-90; IR 0-26; ER 0-20. Current: Progressing, Flexion 0-145, Abduction 0-105,  IR (90 deg abd) 25 deg, ER (90 deg abd) 30 deg, 2/13/2019 3. Patient will increase FOTO score to 70 to increase functional mobility.  
Evaluation: FOTO = 58 
 Current: Regressing, FOTO = 55, 2/13/2019 4. Patient will be able to lift and put her carry on luggage into an overhead bin on an airplane order to perform normal ADLs. Evaluation:  Unable to lift up her carryon and has to check her bags. Current:  Progressing, She can put her backpack up in the overhead bin now. Still checks her other carryon bag.  2/11/19. PLAN [x]  Upgrade activities as tolerated     [x]  Continue plan of care 
[]  Update interventions per flow sheet      
[]  Discharge due to:_ 
[]  Other:_   
 
Armando Valenzuela, PT 2/13/2019  12:24 PM 
 
Future Appointments Date Time Provider Andre Sibley 2/13/2019  5:00 PM Damien Landrum, PT [facility]  
2/14/2019  8:00 AM Lino Ruiz [place] [id number] [facility]  
2/15/2019  4:00 PM Emmanuelle Choudhary [facility]  
2/18/2019  5:00 PM Aydee Wilkes, PT [facility]  
2/20/2019  5:00 PM Damien Landrum, PT [facility]  
2/22/2019  4:00 PM Emmanuelle Choudhary [facility]  
2/27/2019  5:00 PM Damien Landrum, PT [facility]  
3/1/2019  4:00 PM Emmanuelle Choudhary [facility]  
3/4/2019  5:30 PM Aydee Wilkes, PT [facility]  
3/6/2019  5:30 PM Emmanuelle Choudhary [facility]  
3/8/2019  4:00 PM Emmanuelle Choudhary [facility]

## 2019-02-13 NOTE — PROGRESS NOTES
[facility] [address] [facility] [place], [address]  
[phone] fax Progress Note Patient name: Iban Johnson Start of Care: 2019 Referral source: Markel Rivera,* : 1960 Medical/Treatment Diagnosis: Adhesive capsulitis of right shoulder [M75.01] Payor: Alec Gar / Plan: Taran Sanchez PPO / Product Type: PPO /  Onset Date:2018 Prior Hospitalization: see medical history Provider#: [id number] Medications: Verified on Patient Summary List   
 Comorbidities: Arthritis, HBP. Prior Level of Function: Independent, traveled a lot for work. Visits from Start of Care: 6    Missed Visits: 0 Established Goals:   
 
Short Term Goals: To be accomplished in 1 weeks: 1.  Patient will become proficient in her HEP and will be compliant in performing that program. 
Evaluation:  Patient given a written/illustrated HEP. At PN: Met, HEP performance reported as prescribed 
  
Long Term Goals: To be accomplished in 6 weeks: 1. Patient's pain level will be 1-2/10 with activity in order to improve patient's ability to perform normal ADLs. Evaluation:  Pain 0-8/10 At PN: Remains, Pain Levels = 0/10 - 8/10 2. Patient will demonstrate 0-170 flexion; 0-150 abduction; 0-70 IR; 0-70 ER AROM right shoulder to increase ease of ADLs. Evaluation:  PROM flexion 0-138; abduction 0-90; IR 0-26; ER 0-20. At PN: Progressing, Flexion 0-145, Abduction 0-105,  IR (90 deg abd) 25 deg, ER (90 deg abd) 30 deg 3. Patient will increase FOTO score to 70 to increase functional mobility. Evaluation: FOTO = 58 At PN: Regressing, FOTO = 55 
4. Patient will be able to lift and put her carry on luggage into an overhead bin on an airplane order to perform normal ADLs. Evaluation:  Unable to lift up her carryon and has to check her bags.  
At PN:  Progressing, She can put her backpack up in the overhead bin now.  Still checks her other carryon bag 
 
 Key Functional Changes: See above goals. Updated Goals: Continue with unmet goals above. ASSESSMENT/RECOMMENDATIONS: 
 
Patient demonstrates right shoulder AROM as follows: Flexion 145 deg, Abduction 105 deg, IR (90 deg abd) 25 deg, ER (90 deg abd) 30 deg. Patient has been slightly limited in progression since initiation of SoC secondary to work-related travel with patient now with the ability to schedule at frequency 3x/week. With improvements in available shoulder ROM have further progressed strengthening into increasing degrees of elevation to improve ease with functional ADLs.   
Patient will continue to benefit from skilled PT services to modify and progress therapeutic interventions, address functional mobility deficits, address ROM deficits, address strength deficits, analyze and address soft tissue restrictions, analyze and cue movement patterns and analyze and modify body mechanics/ergonomics to attain remaining goals. [x]Continue therapy per initial plan/protocol at a frequency of  2-3 x per week for 6 weeks []Continue therapy with the following recommended changes:_____________________      _____________________________________________________________________ []Discontinue therapy progressing towards or have reached established goals []Discontinue therapy due to lack of appreciable progress towards goals []Discontinue therapy due to lack of attendance or compliance []Await Physician's recommendations/decisions regarding therapy []Other:________________________________________________________________ Thank you for this referral.   
Eri Bravo, PT 2/13/2019 5:12 PM 
NOTE TO PHYSICIAN:  PLEASE COMPLETE THE ORDERS BELOW AND  
FAX TO [facility]: [phone] If you are unable to process this request in 24 hours please contact our office: [phone] []  I have read the above report and request that my patient continue as recommended. []  I have read the above report and request that my patient continue therapy with the following changes/special instructions:________________________________________ []I have read the above report and request that my patient be discharged from therapy.  
 
[de-identified] Signature:____________Date:_________TIME:________ 
 
[facility], Date and Time must be completed for valid certification **

## 2019-02-14 ENCOUNTER — OFFICE VISIT (OUTPATIENT)
Dept: ORTHOPEDIC SURGERY | Age: 59
End: 2019-02-14

## 2019-02-14 VITALS
OXYGEN SATURATION: 100 % | TEMPERATURE: 96.4 F | DIASTOLIC BLOOD PRESSURE: 70 MMHG | HEART RATE: 68 BPM | SYSTOLIC BLOOD PRESSURE: 128 MMHG | RESPIRATION RATE: 16 BRPM | WEIGHT: 174 LBS | HEIGHT: 67 IN | BODY MASS INDEX: 27.31 KG/M2

## 2019-02-14 DIAGNOSIS — M75.01 ADHESIVE CAPSULITIS OF RIGHT SHOULDER: Primary | ICD-10-CM

## 2019-02-14 RX ORDER — TRIAMCINOLONE ACETONIDE 40 MG/ML
40 INJECTION, SUSPENSION INTRA-ARTICULAR; INTRAMUSCULAR ONCE
Qty: 1 ML | Refills: 0
Start: 2019-02-14 | End: 2019-02-14

## 2019-02-14 NOTE — PROGRESS NOTES
Bakari Vasuqez 
1960 Chief Complaint Patient presents with  Shoulder Pain  
  right shoulder pain, f/u appt. HISTORY OF PRESENT ILLNESS Bakari Vasquez is a 66 y.o. female who presents today for reevaluation of right shoulder pain. Patient rates pain as 4/10 today. Patient states that her pain has increased since last office visit. She believes it may be due to physical therapy. Her right shoulder pain is exacerbated with overhead activity and reaching behind. Patient denies any fever, chills, chest pain, shortness of breath or calf pain. The remainder of the review of systems is negative. There are no new illness or injuries to report since last seen in the office. There are no changes to medications, allergies, family or social history. PHYSICAL EXAM:  
Visit Vitals /70 (BP 1 Location: Left arm, BP Patient Position: Sitting) Pulse 68 Temp 96.4 °F (35.8 °C) (Oral) Resp 16 Ht 5' 7\" (1.702 m) Wt 174 lb (78.9 kg) SpO2 100% BMI 27.25 kg/m² The patient is a well-developed, well-nourished female   in no acute distress. The patient is alert and oriented times three. The patient is alert and oriented times three. Mood and affect are normal. 
LYMPHATIC: lymph nodes are not enlarged and are within normal limits SKIN: normal in color and non tender to palpation. There are no bruises or abrasions noted. NEUROLOGICAL: Motor sensory exam is within normal limits. Reflexes are equal bilaterally. There is normal sensation to pinprick and light touch MUSCULOSKELETAL: 
Examination Right shoulder Skin Intact AC joint tenderness - Biceps tenderness - Forward flexion/Elevation  Active abduction  Glenohumeral abduction 90 External rotation ROM 45 Internal rotation ROM 30 Apprehension -  
Akhils Relocation -  
Jerk - Load and Shift -  
Maty Henley - Guevara - Impingement sign + Supraspinatus/Empty Can -, 5/5 External Rotation Strength -, 5/5  
 Lift Off/Belly Press -, 5/5 Neurovascular Intact PROCEDURE: Right shoulder Injection with Ultrasound Guidance After sterile prep, 6 cc of Xylocaine and 1 cc of Kenalog were injected into the right shoulder. Ultrasound images captured using [address] Ultrasound machine and scanned into patient's chart. [facility] PROCEDURE PROGRESS NOTE Chart reviewed for the following: 
Kemi Syed M.D, have reviewed the History, Physical and updated the Allergic reactions for Marino Silver TIME OUT performed immediately prior to start of procedure: 
I, Tanisha Hager M.D, have performed the following reviews on Marino Silver prior to the start of the procedure: 
         
* Patient was identified by name and date of birth * Agreement on procedure being performed was verified * Risks and Benefits explained to the patient * Procedure site verified and marked as necessary * Patient was positioned for comfort * Consent was signed and verified Time: 8:15 AM  
 
Date of procedure: 2/14/2019 Procedure performed by:  Tanisha Hager M.D Provider assisted by: (see medication administration) How tolerated by patient: tolerated the procedure well with no complications Comments: none IMAGING: MRI of right shoulder dated 1/11/19 was reviewed and read:  
IMPRESSION: 
1. There is at least severe tendinosis with interstitial tearing involving the distal supraspinatus. Some undersurface and bursal surface fibrillation/tearing is possible but not convincingly so. There is no tendon retraction and there is 
no muscle atrophy.   
2. Possibility of nondisplaced superior anterior labral tear, versus anatomic variant.  
 3. Moderately pronounced AC joint arthritis with minimal bursitis.  
 4. Edema within the axillary recess. Though nonspecific, can be associated with adhesive capsulitis. XR right shoulder dated 12/13/18 was reviewed and read: no acute abnormalities IMPRESSION:   
  ICD-10-CM ICD-9-CM 1. Adhesive capsulitis of right shoulder M75.01 726.0 TRIAMCINOLONE ACETONIDE INJ  
   triamcinolone acetonide (KENALOG) 40 mg/mL injection US GUIDE INJ/ASP/ARTHRO LG JNT/BURSA PLAN:  
1. The patient presents today with right shoulder pain due to adhesive capsulitis and I would like her to continue therapy and give her a cortisone injection. Risk factors include: n/a 2. No ultrasound exam indicated today. 3. Yes cortisone injection indicated today. US R SHOULDER 
4. No Physical Therapy indicated today 5. No diagnostic test indicated today: 6. No durable medical equipment indicated today 7. No referral indicated today 8. No medications indicated today: 9. No Narcotic indicated today RTC 4 weeks if pain continues Follow-up Disposition: Not on File Scribed by Bridgett Ortega ([address] [facility] [address]) as dictated by Kavita Aguayo MD 
 
I, Dr. Kavita Aguayo, confirm that all documentation is accurate.  
 
NATALIIA Keating [facility]

## 2019-02-14 NOTE — PROGRESS NOTES
1. Have you been to the ER, urgent care clinic since your last visit? Hospitalized since your last visit? NO 
 
2. Have you seen or consulted any other health care providers outside of the [address] [place] [address] since your last visit? Include any pap smears or colon screening.  NO

## 2019-02-15 ENCOUNTER — HOSPITAL ENCOUNTER (OUTPATIENT)
Dept: PHYSICAL THERAPY | Age: 59
Discharge: HOME | End: 2019-02-15
Payer: COMMERCIAL

## 2019-02-15 PROCEDURE — 97110 THERAPEUTIC EXERCISES: CPT

## 2019-02-15 PROCEDURE — 97140 MANUAL THERAPY 1/> REGIONS: CPT

## 2019-02-15 NOTE — PROGRESS NOTES
PT DAILY TREATMENT NOTE 10-18 Patient Name: Latonya Hensley Date:2/15/2019 : 1960 [x]  Patient  Verified Payor: Nakul Morales / Plan: Nile Sawyer PPO / Product Type: PPO / In time:400 Out time:452 Total Treatment Time (min): 52 Visit #: 7 of 18 Medicare/BCBS Only Total Timed Codes (min):  42 1:1 Treatment Time:  45 Treatment Area: Adhesive capsulitis of right shoulder [M75.01] SUBJECTIVE Pain Level (0-10 scale): 2 Any medication changes, allergies to medications, adverse drug reactions, diagnosis change, or new procedure performed?: [x] No    [] Yes (see summary sheet for update) Subjective functional status/changes:   [] No changes reported Pt notes she received a cortisone shot yesterday. Notes her shoulder is a little sore. OBJECTIVE Modality rationale: decrease pain and increase tissue extensibility to improve the patients ability to improve sleep quality Min Type Additional Details  
 [] Estim:  []Unatt       []IFC  []Premod []Other:  []w/ice   []w/heat Position: Location:  
 [] Estim: []Att    []TENS instruct  []NMES []Other:  []w/US   []w/ice   []w/heat Position: Location:  
 []  Traction: [] Cervical       []Lumbar 
                     [] Prone          []Supine []Intermittent   []Continuous Lbs: 
[] before manual 
[] after manual  
 []  Ultrasound: []Continuous   [] Pulsed []1MHz   []3MHz W/cm2: 
Location:  
 []  Iontophoresis with dexamethasone Location: [] Take home patch  
[] In clinic  
10 []  Ice     [x]  heat 
[]  Ice massage 
[]  Laser  
[]  Anodyne Position:sitting Location:right shoulder  
 []  Laser with stim 
[]  Other:  Position: Location:  
 []  Vasopneumatic Device Pressure:       [] lo [] med [] hi  
Temperature: [] lo [] med [] hi  
[] Skin assessment post-treatment:  []intact []redness- no adverse reaction 
  []redness  adverse reaction: 32 min Therapeutic Exercise:  [x] See flow sheet :  
Rationale: increase ROM and increase strength to improve the patients ability to increase ease with self care tasks 10 min Manual Therapy:  PROM in all directions, inferior and posterior joints GH mobs grade 3-4 Rationale: decrease pain, increase ROM and increase tissue extensibility to increase ease with overhead reaching With 
 [] TE 
 [] TA 
 [] neuro 
 [] other: Patient Education: [x] Review HEP [] Progressed/Changed HEP based on:  
[] positioning   [] body mechanics   [] transfers   [] heat/ice application   
[] other:   
 
  
 
Pain Level (0-10 scale) post treatment: 1 ASSESSMENT/Changes in Function: Pt demonstrates with improved tolerance to passive external rotation shoulder stretch. Patient will continue to benefit from skilled PT services to modify and progress therapeutic interventions, address functional mobility deficits, address ROM deficits, address strength deficits, analyze and address soft tissue restrictions, analyze and cue movement patterns, analyze and modify body mechanics/ergonomics, assess and modify postural abnormalities and instruct in home and community integration to attain remaining goals. [x]  See Plan of Care 
[]  See progress note/recertification 
[]  See Discharge Summary Progress towards goals / Updated goals: 
Short Term Goals: To be accomplished in 1 weeks: 1.  Patient will become proficient in her HEP and will be compliant in performing that program. 
Evaluation:  Patient given a written/illustrated HEP. Current: Met, HEP performance reported as prescribed, 2/7/2019 
  
Long Term Goals: To be accomplished in 6 weeks: 1. Patient's pain level will be 1-2/10 with activity in order to improve patient's ability to perform normal ADLs. Evaluation:  Pain 0-8/10 Current: Remains, Pain Levels = 0/10 - 8/10. 2/11/19. 
2. Patient will demonstrate 0-170 flexion; 0-150 abduction; 0-70 IR; 0-70 ER AROM right shoulder to increase ease of ADLs. Evaluation:  PROM flexion 0-138; abduction 0-90; IR 0-26; ER 0-20. Current: Progressing, Flexion 0-145, Abduction 0-105,  IR (90 deg abd) 25 deg, ER (90 deg abd) 30 deg, 2/13/2019 3. Patient will increase FOTO score to 70 to increase functional mobility. Evaluation: FOTO = 58 Current: Regressing, FOTO = 55, 2/13/2019 4. Patient will be able to lift and put her carry on luggage into an overhead bin on an airplane order to perform normal ADLs. Evaluation:  Unable to lift up her carryon and has to check her bags. Current:  Progressing, She can put her backpack up in the overhead bin now.  Still checks her other carryon bag.  2/11/19. PLAN [x]  Upgrade activities as tolerated     [x]  Continue plan of care 
[]  Update interventions per flow sheet      
[]  Discharge due to:_ 
[]  Other:_ Genice Jobs 2/15/2019  4:13 PM 
 
Future Appointments Date Time Provider Andre Sibley 2/18/2019  5:00 PM Bharat Valentine PT [facility]  
2/20/2019  5:00 PM Merlin, [address] [facility]  
2/22/2019  4:00 PM Verna Cortes [facility]  
2/27/2019  5:00 PM Elsy Linares PT [facility]  
3/1/2019  4:00 PM Verna Cortes [facility]  
3/4/2019  5:30 PM Bharat Valentine PT [facility]  
3/6/2019  5:30 PM Verna Cortes [facility]  
3/8/2019  4:00 PM Verna Cortes [facility]  
3/14/2019  8:00 AM Dima Fletcher MD [facility] [address]

## 2019-02-18 ENCOUNTER — HOSPITAL ENCOUNTER (OUTPATIENT)
Dept: PHYSICAL THERAPY | Age: 59
Discharge: HOME | End: 2019-02-18
Payer: COMMERCIAL

## 2019-02-18 PROCEDURE — 97110 THERAPEUTIC EXERCISES: CPT | Performed by: PHYSICAL THERAPIST

## 2019-02-18 PROCEDURE — 97140 MANUAL THERAPY 1/> REGIONS: CPT | Performed by: PHYSICAL THERAPIST

## 2019-02-18 NOTE — PROGRESS NOTES
PT DAILY TREATMENT NOTE 10-18 Patient Name: Jackie Sprague Date:2019 : 1960 [x]  Patient  Verified Payor: Gilda Magana / Plan: Rina Quiroga PPO / Product Type: PPO / In time:4:52  Out time:6:00 Total Treatment Time (min): 68 Visit #: 8 of 18 Medicare/BCBS Only Total Timed Codes (min):  58 1:1 Treatment Time:  30 Treatment Area: Adhesive capsulitis of right shoulder [M75.01] SUBJECTIVE Pain Level (0-10 scale): 0/10 Any medication changes, allergies to medications, adverse drug reactions, diagnosis change, or new procedure performed?: [x] No    [] Yes (see summary sheet for update) Subjective functional status/changes:   [] No changes reported Patient had a cortisone injection last week which has helped with decrease her pain. OBJECTIVE Modality rationale: decrease pain to improve the patients ability to increase ease of motion to improve function. Min Type Additional Details  
 [] Estim:  []Unatt       []IFC  []Premod []Other:  []w/ice   []w/heat Position: Location:  
 [] Estim: []Att    []TENS instruct  []NMES []Other:  []w/US   []w/ice   []w/heat Position: Location:  
 []  Traction: [] Cervical       []Lumbar 
                     [] Prone          []Supine []Intermittent   []Continuous Lbs: 
[] before manual 
[] after manual  
 []  Ultrasound: []Continuous   [] Pulsed []1MHz   []3MHz W/cm2: 
Location:  
 []  Iontophoresis with dexamethasone Location: [] Take home patch  
[] In clinic  
10 []  Ice     [x]  heat 
[]  Ice massage 
[]  Laser  
[]  Anodyne Position: sitting Location: right shoulder  
 []  Laser with stim 
[]  Other:  Position: Location:  
 []  Vasopneumatic Device Pressure:       [] lo [] med [] hi  
Temperature: [] lo [] med [] hi  
[] Skin assessment post-treatment:  []intact []redness- no adverse reaction 
  []redness  adverse reaction: 45 min Therapeutic Exercise:  [] See flow sheet :  
Rationale: increase ROM and increase strength to improve the patients ability to increase their functional activity level. 13 min Manual Therapy:  Grade III, IV GH joint mobs in supine to increase elevation and rotation. Rationale: increase ROM and increase tissue extensibility to increase ease of motion to improve function. With 
 [] TE 
 [] TA 
 [] neuro 
 [] other: Patient Education: [x] Review HEP [] Progressed/Changed HEP based on:  
[] positioning   [] body mechanics   [] transfers   [] heat/ice application   
[] other:   
 
Other Objective/Functional Measures: PROM is [facility] with most limitation noted into IR. She has pain at end range of motion. Pain Level (0-10 scale) post treatment: 0/10 ASSESSMENT/Changes in Function: Patient with decreased pain due to injection. Patient will continue to benefit from skilled PT services to modify and progress therapeutic interventions, address functional mobility deficits, address ROM deficits, address strength deficits, analyze and address soft tissue restrictions, analyze and cue movement patterns, analyze and modify body mechanics/ergonomics and assess and modify postural abnormalities to attain remaining goals. [x]  See Plan of Care 
[]  See progress note/recertification 
[]  See Discharge Summary Progress towards goals / Updated goals: 
Short Term Goals: To be accomplished in 1 weeks: 1.  Patient will become proficient in her HEP and will be compliant in performing that program. 
Evaluation:  Patient given a written/illustrated HEP. Current: Met, HEP performance reported as prescribed, 2/7/2019 
  
Long Term Goals: To be accomplished in 6 weeks: 1. Patient's pain level will be 1-2/10 with activity in order to improve patient's ability to perform normal ADLs. Evaluation:  Pain 0-8/10 Current: Remains, Pain Levels = 0/10 - 8/10. 2/11/19. 2. Patient will demonstrate 0-170 flexion; 0-150 abduction; 0-70 IR; 0-70 ER AROM right shoulder to increase ease of ADLs. Evaluation:  PROM flexion 0-138; abduction 0-90; IR 0-26; ER 0-20. Current: Progressing, Flexion 0-145, Abduction 0-105,  IR (90 deg abd) 25 deg, ER (90 deg abd) 30 deg, 2/13/2019 3. Patient will increase FOTO score to 70 to increase functional mobility. Evaluation: FOTO = 58 Current: Regressing, FOTO = 55, 2/13/2019 4. Patient will be able to lift and put her carry on luggage into an overhead bin on an airplane order to perform normal ADLs. Evaluation:  Unable to lift up her carryon and has to check her bags. Current:  Progressing, She can put her backpack up in the overhead bin now. Still checks her other carryon bag.  2/11/19. PLAN [x]  Upgrade activities as tolerated     [x]  Continue plan of care 
[]  Update interventions per flow sheet      
[]  Discharge due to:_ 
[]  Other:_ Paulino Lucas PT 2/18/2019  4:55 PM 
 
Future Appointments Date Time Provider Andre Sibley 2/18/2019  5:00 PM Karolina Mckeon PT [facility]  
2/20/2019  5:00 PM Kelsey Boyer [facility]  
2/22/2019  4:00 PM Tiffany [facility]  
2/27/2019  5:00 PM SOPHIA [facility]PTS [facility]  
3/1/2019  4:00 PM Tiffany [facility]  
3/4/2019  5:30 PM SOPHIA Yeager [facility]  
3/6/2019  5:30 PM Tiffany [facility]  
3/8/2019  4:00 PM Tiffany Dasilva [facility]  
3/14/2019  8:00 AM Jade Tierney MD [facility] [address]

## 2019-02-20 ENCOUNTER — HOSPITAL ENCOUNTER (OUTPATIENT)
Dept: PHYSICAL THERAPY | Age: 59
Discharge: HOME | End: 2019-02-20
Payer: COMMERCIAL

## 2019-02-20 PROCEDURE — 97140 MANUAL THERAPY 1/> REGIONS: CPT

## 2019-02-20 PROCEDURE — 97110 THERAPEUTIC EXERCISES: CPT

## 2019-02-20 NOTE — PROGRESS NOTES
PT DAILY TREATMENT NOTE 10-18 Patient Name: Hema Meier Date:2019 : 1960 [x]  Patient  Verified Payor: Macie Radford / Plan: Nannette Qiu PPO / Product Type: PPO / In time:455  Out time:556 Total Treatment Time (min): 61 Visit #: 4 MF 87 Medicare/BCBS Only Total Timed Codes (min):  51 1:1 Treatment Time:  46  
 
 
Treatment Area: Adhesive capsulitis of right shoulder [M75.01] SUBJECTIVE Pain Level (0-10 scale): 0 Any medication changes, allergies to medications, adverse drug reactions, diagnosis change, or new procedure performed?: [x] No    [] Yes (see summary sheet for update) Subjective functional status/changes:   [] No changes reported Patient reports no change since last treatment session. OBJECTIVE Modality rationale: decrease pain and increase tissue extensibility to improve the patients ability to improve ease with sleep Min Type Additional Details 10 []  Ice     [x]  heat 
[]  Ice massage Position: Reclined Location: Right Shoulder, Post-tx 41 min Therapeutic Exercise:  [x] See flow sheet : Emphasis placed on improving available shoulder AROM and strength Rationale: increase ROM and increase strength to improve the patients ability to improve ease with overhead lifting 
  
10 min Manual Therapy:   
Supine, Right GH Lateral Grade II-III Mobilization (OPP) Supine, Right GH Inferior Grade II-III Mobilization (OPP, inc deg abd) Supine, Right GH AP Grade II-III Mobilization (OPP) Supine, Right Shoulder Passive Physiological Mobilization - Flex/Abd/Scaption Rationale: decrease pain, increase ROM and increase tissue extensibility to improve ease with work-related ADLs With 
 [] TE 
 [] TA 
 [] neuro 
 [] other: Patient Education: [x] Review HEP [] Progressed/Changed HEP based on:  
[] positioning   [] body mechanics   [] transfers   [] heat/ice application   
[] other:   
 
Other Objective/Functional Measures: Flexion 0-145, Abduction 0-120,  IR (90 deg abd) 32deg, ER (90 deg abd) 52 deg Pain Level (0-10 scale) post treatment: 0 
 
ASSESSMENT/Changes in Function: Improved available shoulder rotational ROM demonstrated with continued abnormal firm end-feels at end-range. Initiated prone scapular exercises to promote improved scapulohumeral rhythm with UE elevation. Patient will continue to benefit from skilled PT services to modify and progress therapeutic interventions, address functional mobility deficits, address ROM deficits, address strength deficits, analyze and address soft tissue restrictions and analyze and cue movement patterns to attain remaining goals. []  See Plan of Care 
[]  See progress note/recertification 
[]  See Discharge Summary Progress towards goals / Updated goals: 
 
Short Term Goals: To be accomplished in 1 weeks: 1.  Patient will become proficient in her HEP and will be compliant in performing that program. 
Evaluation:  Patient given a written/illustrated HEP. Current: Met, HEP performance reported as prescribed, 2/7/2019 
  
Long Term Goals: To be accomplished in 6 weeks: 1. Patient's pain level will be 1-2/10 with activity in order to improve patient's ability to perform normal ADLs. Evaluation:  Pain 0-8/10 Current: Remains, Pain Levels = 0/10 - 8/10. 2/11/19. 
2. Patient will demonstrate 0-170 flexion; 0-150 abduction; 0-70 IR; 0-70 ER AROM right shoulder to increase ease of ADLs. Evaluation:  PROM flexion 0-138; abduction 0-90; IR 0-26; ER 0-20. Current: Progressing, Flexion 0-145, Abduction 0-120,  IR (90 deg abd) 32deg, ER (90 deg abd) 52 deg, 2/20/2019 3. Patient will increase FOTO score to 70 to increase functional mobility. Evaluation: FOTO = 58 Current: Regressing, FOTO = 55, 2/13/2019 4. Patient will be able to lift and put her carry on luggage into an overhead bin on an airplane order to perform normal ADLs. Evaluation:  Unable to lift up her carryon and has to check her bags. Current:  Progressing, She can put her backpack up in the overhead bin now. Still checks her other carryon bag.  2/11/19. PLAN [x]  Upgrade activities as tolerated     [x]  Continue plan of care 
[]  Update interventions per flow sheet      
[]  Discharge due to:_ 
[]  Other:_   
 
Wenceslao Ram, PT 2/20/2019  12:34 PM 
 
Future Appointments Date Time Provider Andre Sibley 2/20/2019  5:00 PM Merlin, [address] [facility]  
2/22/2019  4:00 PM [employer] [facility]  
2/27/2019  5:00 PM Radha Benjamin PT [facility]  
3/1/2019  4:00 PM [employer] [facility]  
3/4/2019  5:30 PM Gurpreet Miguel PT [facility]  
3/6/2019  5:30 PM [employer] [facility]  
3/8/2019  4:00 PM [employer] [facility]  
3/14/2019  8:00 AM Carrington Manning MD [facility]

## 2019-02-22 ENCOUNTER — HOSPITAL ENCOUNTER (OUTPATIENT)
Dept: PHYSICAL THERAPY | Age: 59
Discharge: HOME | End: 2019-02-22
Payer: COMMERCIAL

## 2019-02-22 PROCEDURE — 97140 MANUAL THERAPY 1/> REGIONS: CPT

## 2019-02-22 PROCEDURE — 97110 THERAPEUTIC EXERCISES: CPT

## 2019-02-22 NOTE — PROGRESS NOTES
PT DAILY TREATMENT NOTE 10-18 Patient Name: Marino Bernabe Date:2019 : 1960 [x]  Patient  Verified Payor: Carley Hayward / Plan: Rajesh Heater PPO / Product Type: PPO / In time: 348  Out time:445 Total Treatment Time (min): 57 Visit #: 10 of 18 Medicare/BCBS Only Total Timed Codes (min):  47 1:1 Treatment Time:  40 Treatment Area: Adhesive capsulitis of right shoulder [M75.01] SUBJECTIVE Pain Level (0-10 scale): 0 Any medication changes, allergies to medications, adverse drug reactions, diagnosis change, or new procedure performed?: [x] No    [] Yes (see summary sheet for update) Subjective functional status/changes:   [] No changes reported Pt continues to report no pain. Notes it is becoming easier to perform household ADls OBJECTIVE Modality rationale: decrease pain and increase tissue extensibility to improve the patients ability to increase ease with slee Min Type Additional Details  
 [] Estim:  []Unatt       []IFC  []Premod []Other:  []w/ice   []w/heat Position: Location:  
 [] Estim: []Att    []TENS instruct  []NMES []Other:  []w/US   []w/ice   []w/heat Position: Location:  
 []  Traction: [] Cervical       []Lumbar 
                     [] Prone          []Supine []Intermittent   []Continuous Lbs: 
[] before manual 
[] after manual  
 []  Ultrasound: []Continuous   [] Pulsed []1MHz   []3MHz W/cm2: 
Location:  
 []  Iontophoresis with dexamethasone Location: [] Take home patch  
[] In clinic  
10 []  Ice     [x]  heat 
[]  Ice massage 
[]  Laser  
[]  Anodyne Position:sitting Location:right shoulder  
 []  Laser with stim 
[]  Other:  Position: Location:  
 []  Vasopneumatic Device Pressure:       [] lo [] med [] hi  
Temperature: [] lo [] med [] hi  
[] Skin assessment post-treatment:  []intact []redness- no adverse reaction 
  []redness  adverse reaction: 37 min Therapeutic Exercise:  [x] See flow sheet :  
Rationale: increase ROM and increase strength to improve the patients ability to increase ease with self care tasks 10 min Manual Therapy:  Shoulder PROM in all direction, GH inferior and posterior joints mobs grade 3, inferior and posterior joint mobs with movement grade 3 Rationale: decrease pain, increase ROM and increase tissue extensibility to increase ease with reaching overhead With 
 [] TE 
 [] TA 
 [] neuro 
 [] other: Patient Education: [x] Review HEP [] Progressed/Changed HEP based on:  
[] positioning   [] body mechanics   [] transfers   [] heat/ice application   
[] other:   
 
  
 
Pain Level (0-10 scale) post treatment: 0 
 
ASSESSMENT/Changes in Function: Pt with good tolerance to Yolanda and manuals. Pt reports no increase in pain during today's treatment, just soreness. Patient will continue to benefit from skilled PT services to modify and progress therapeutic interventions, address functional mobility deficits, address ROM deficits, address strength deficits, analyze and address soft tissue restrictions, analyze and cue movement patterns, analyze and modify body mechanics/ergonomics, assess and modify postural abnormalities and instruct in home and community integration to attain remaining goals. [x]  See Plan of Care 
[]  See progress note/recertification 
[]  See Discharge Summary Progress towards goals / Updated goals: 
Short Term Goals: To be accomplished in 1 weeks: 1.  Patient will become proficient in her HEP and will be compliant in performing that program. 
Evaluation:  Patient given a written/illustrated HEP. Current: Met, HEP performance reported as prescribed, 2/7/2019 
  
Long Term Goals: To be accomplished in 6 weeks: 1. Patient's pain level will be 1-2/10 with activity in order to improve patient's ability to perform normal ADLs. Evaluation:  Pain 0-8/10 Current: Remains, Pain Levels = 0/10 - 8/10. 2/11/19. 
2. Patient will demonstrate 0-170 flexion; 0-150 abduction; 0-70 IR; 0-70 ER AROM right shoulder to increase ease of ADLs. Evaluation:  PROM flexion 0-138; abduction 0-90; IR 0-26; ER 0-20. Current: Progressing, Flexion 0-145, Abduction 0-120,  IR (90 deg abd) 32deg, ER (90 deg abd) 52 deg, 2/20/2019 3. Patient will increase FOTO score to 70 to increase functional mobility. Evaluation: FOTO = 58 Current: Regressing, FOTO = 55, 2/13/2019 4. Patient will be able to lift and put her carry on luggage into an overhead bin on an airplane order to perform normal ADLs. Evaluation:  Unable to lift up her carryon and has to check her bags. Current:  Progressing, She can put her backpack up in the overhead bin now. Still checks her other carryon bag.  2/11/19. 
  
 
PLAN [x]  Upgrade activities as tolerated     [x]  Continue plan of care 
[]  Update interventions per flow sheet      
[]  Discharge due to:_ 
[]  Other:_ Jared Gabriel 2/22/2019  3:59 PM 
 
Future Appointments Date Time Provider Andre Maryjo 2/22/2019  4:00 PM Antonio Pabon [facility]  
2/27/2019  5:00 PM Aurelio Lanza PT [facility]  
3/1/2019  4:00 PM Antonio Pabon [facility]  
3/4/2019  5:30 PM Baylee Field PT [facility]  
3/6/2019  5:30 PM Antonio Pabon [facility]  
3/8/2019  4:00 PM Antonio Pabon [facility]  
3/14/2019  8:00 AM Kendra Bartlett MD [facility] 69

## 2019-02-27 ENCOUNTER — HOSPITAL ENCOUNTER (OUTPATIENT)
Dept: PHYSICAL THERAPY | Age: 59
Discharge: HOME | End: 2019-02-27
Payer: COMMERCIAL

## 2019-02-27 PROCEDURE — 97110 THERAPEUTIC EXERCISES: CPT

## 2019-02-27 PROCEDURE — 97140 MANUAL THERAPY 1/> REGIONS: CPT

## 2019-02-27 NOTE — PROGRESS NOTES
PT DAILY TREATMENT NOTE 10- Patient Name: Willy Andino Date:2019 : 1960 [x]  Patient  Verified Payor: Felipe Mcgill / Plan: Cele Jang PPO / Product Type: PPO / In time:451  Out time:553 Total Treatment Time (min): 62 Visit #: 3 of 18 (signed PN 2019) Medicare/BCBS Only Total Timed Codes (min):  52 1:1 Treatment Time:  30 Treatment Area: Adhesive capsulitis of right shoulder [M75.01] SUBJECTIVE Pain Level (0-10 scale): 0 Any medication changes, allergies to medications, adverse drug reactions, diagnosis change, or new procedure performed?: [x] No    [] Yes (see summary sheet for update) Subjective functional status/changes:   [] No changes reported Patient reports limited completion of prescribed HEP over last few days due to being out of town. OBJECTIVE Modality rationale: decrease pain and increase tissue extensibility to improve the patients ability to improve ease with sleep Min Type Additional Details 10 []  Ice     [x]  heat 
[]  Ice massage Position: Reclined Location: Right Shoulder, Post-tx   
  
42 min Therapeutic Exercise:  [x] See flow sheet : Emphasis placed on improving available shoulder AROM and strength Rationale: increase ROM and increase strength to improve the patients ability to improve ease with overhead lifting 
  
10 min Manual Therapy:   
Supine, Right GH Lateral Grade II-III Mobilization (OPP) Supine, Right GH Inferior Grade II-III Mobilization (OPP, inc deg abd) Supine, Right GH AP Grade II-III Mobilization (OPP) Supine, Right Shoulder Passive Physiological Mobilization - Flex/Abd/Scaption Rationale: decrease pain, increase ROM and increase tissue extensibility to improve ease with work-related ADLs With 
 [] TE 
 [] TA 
 [] neuro 
 [] other: Patient Education: [x] Review HEP [] Progressed/Changed HEP based on:  
[] positioning   [] body mechanics   [] transfers   [] heat/ice application   
[] other: Other Objective/Functional Measures:  
Flexion 0-145, Abduction 0-120,  IR (90 deg abd) 32deg, ER (90 deg abd) 52 deg Pain Level (0-10 scale) post treatment: 0 
 
ASSESSMENT/Changes in Function: Patient noted upon assessment to demonstrate greater glenohumeral capsular hypomobility with patient subjectively reporting limited completion of prescribed HEP over last few days due to being out of town. Patient will continue to benefit from skilled PT services to modify and progress therapeutic interventions, address functional mobility deficits, address ROM deficits, address strength deficits, analyze and address soft tissue restrictions, analyze and cue movement patterns and analyze and modify body mechanics/ergonomics to attain remaining goals. []  See Plan of Care 
[]  See progress note/recertification 
[]  See Discharge Summary Progress towards goals / Updated goals: 
 
Short Term Goals: To be accomplished in 1 weeks: 1.  Patient will become proficient in her HEP and will be compliant in performing that program. 
Evaluation:  Patient given a written/illustrated HEP. Current: Met, HEP performance reported as prescribed, 2/7/2019 
  
Long Term Goals: To be accomplished in 6 weeks: 1. Patient's pain level will be 1-2/10 with activity in order to improve patient's ability to perform normal ADLs. Evaluation:  Pain 0-8/10 Current: Remains, Pain Levels = 0/10 - 8/10. 2/11/19. 
2. Patient will demonstrate 0-170 flexion; 0-150 abduction; 0-70 IR; 0-70 ER AROM right shoulder to increase ease of ADLs. Evaluation:  PROM flexion 0-138; abduction 0-90; IR 0-26; ER 0-20. Current: Progressing, Flexion 0-145, Abduction 0-120,  IR (90 deg abd) 32deg, ER (90 deg abd) 52 deg, 2/27/2019 3. Patient will increase FOTO score to 70 to increase functional mobility. Evaluation: FOTO = 58 Current: Regressing, FOTO = 55, 2/13/2019 4.  Patient will be able to lift and put her carry on luggage into an overhead bin on an airplane order to perform normal ADLs. Evaluation:  Unable to lift up her carryon and has to check her bags. Current:  Progressing, She can put her backpack up in the overhead bin now. Still checks her other carryon bag.  2/11/19. PLAN [x]  Upgrade activities as tolerated     [x]  Continue plan of care 
[]  Update interventions per flow sheet      
[]  Discharge due to:_ 
[]  Other:_   
 
Mi Cleaning, PT 2/27/2019  12:23 PM 
 
Future Appointments Date Time Provider Andre Sibley 2/27/2019  5:00 PM Sunny Allred, PT [facility]  
3/1/2019  4:00 PM Carlitos Meier [facility]  
3/4/2019  5:30 PM Monroe King, SOPHIA [facility]  
3/6/2019  5:30 PM Carlitos Meier [facility]  
3/8/2019  4:00 PM Carlitos Meier [facility]  
3/14/2019  8:00 AM Keely Kaur MD [facility] 69

## 2019-03-01 ENCOUNTER — HOSPITAL ENCOUNTER (OUTPATIENT)
Dept: PHYSICAL THERAPY | Age: 59
Discharge: HOME | End: 2019-03-01
Payer: COMMERCIAL

## 2019-03-01 PROCEDURE — 97140 MANUAL THERAPY 1/> REGIONS: CPT

## 2019-03-01 PROCEDURE — 97110 THERAPEUTIC EXERCISES: CPT

## 2019-03-01 NOTE — PROGRESS NOTES
PT DAILY TREATMENT NOTE 10 Patient Name: Gregorio Keane Date:3/1/2019 : 1960 [x]  Patient  Verified Payor: Gaetano Suarez / Plan: Samara Woods Se HMO / Product Type: HMO /   
In time:357  Out time:454 Total Treatment Time (min): 57 Visit #: 4 of 18 Medicare/BCBS Only Total Timed Codes (min):  47 1:1 Treatment Time:  44 Treatment Area: Adhesive capsulitis of right shoulder [M75.01] SUBJECTIVE Pain Level (0-10 scale): 0 Any medication changes, allergies to medications, adverse drug reactions, diagnosis change, or new procedure performed?: [x] No    [] Yes (see summary sheet for update) Subjective functional status/changes:   [] No changes reported Patient reports no adverse response after last treatment session. OBJECTIVE Modality rationale: decrease pain and increase tissue extensibility to improve the patients ability to improve ease with sleep Min Type Additional Details    
10 []  Ice     [x]  heat 
[]  Ice massage Position: Reclined Location: Right Shoulder, Post-tx    
  
37 min Therapeutic Exercise:  [x] See flow sheet : Emphasis placed on improving available shoulder AROM and strength Rationale: increase ROM and increase strength to improve the patients ability to improve ease with overhead lifting 
  
10 min Manual Therapy:   
Supine, Right GH Lateral Grade II-III Mobilization (OPP) Supine, Right GH Inferior Grade II-III Mobilization (OPP, inc deg abd) Supine, Right GH AP Grade II-III Mobilization (OPP) Supine, Right Shoulder Passive Physiological Mobilization - Flex/Abd/Scaption Rationale: decrease pain, increase ROM and increase tissue extensibility to improve ease with work-related ADLs With 
 [] TE 
 [] TA 
 [] neuro 
 [] other: Patient Education: [x] Review HEP [] Progressed/Changed HEP based on:  
[] positioning   [] body mechanics   [] transfers   [] heat/ice application   
[] other:   
 
Other Objective/Functional Measures: Flexion 0-145, Abduction 0-125 Pain Level (0-10 scale) post treatment: 0 
 
ASSESSMENT/Changes in Function: Objective improvement in shoulder abduction demonstrated but with continued poor scapulohumeral rhythm observed. With overhead elevation patient benefits from verbal cuing to correct thoracic hyperextension compensation. Patient will continue to benefit from skilled PT services to modify and progress therapeutic interventions, address functional mobility deficits, address ROM deficits, address strength deficits, analyze and address soft tissue restrictions, analyze and cue movement patterns and analyze and modify body mechanics/ergonomics to attain remaining goals. []  See Plan of Care 
[]  See progress note/recertification 
[]  See Discharge Summary Progress towards goals / Updated goals: 
 
Short Term Goals: To be accomplished in 1 weeks: 1.  Patient will become proficient in her HEP and will be compliant in performing that program. 
Evaluation:  Patient given a written/illustrated HEP. Current: Met, HEP performance reported as prescribed, 2/7/2019 
  
Long Term Goals: To be accomplished in 6 weeks: 1. Patient's pain level will be 1-2/10 with activity in order to improve patient's ability to perform normal ADLs. Evaluation:  Pain 0-8/10 Current: Remains, Pain Levels = 0/10 - 8/10. 2/11/19. 
2. Patient will demonstrate 0-170 flexion; 0-150 abduction; 0-70 IR; 0-70 ER AROM right shoulder to increase ease of ADLs. Evaluation:  PROM flexion 0-138; abduction 0-90; IR 0-26; ER 0-20. Current: Progressing, Flexion 0-145, Abduction 0-125,  IR (90 deg abd) 32deg, ER (90 deg abd) 52 deg, 3/1/2019 3. Patient will increase FOTO score to 70 to increase functional mobility. Evaluation: FOTO = 58 Current: Regressing, FOTO = 55, 2/13/2019 4. Patient will be able to lift and put her carry on luggage into an overhead bin on an airplane order to perform normal ADLs. Evaluation:  Unable to lift up her carryon and has to check her bags. Current:  Progressing, She can put her backpack up in the overhead bin now. Still checks her other carryon bag.  2/11/19. PLAN [x]  Upgrade activities as tolerated     [x]  Continue plan of care 
[]  Update interventions per flow sheet      
[]  Discharge due to:_ 
[]  Other:_   
 
Antonino Sparks, PT 3/1/2019  7:38 AM 
 
Future Appointments Date Time Provider Andre Sibley 3/1/2019  4:00 PM Chaitanya Hughes, PT [facility]  
3/4/2019  5:30 PM Toney Bradley, PT [facility]  
3/6/2019  5:30 PM Afia Mccarthy [facility]  
3/8/2019  7:30 AM Chaitanya Hughes, PT [facility]  
3/13/2019  5:00 PM Chaitanya Hughes, PT [facility]  
3/14/2019  8:00 AM Angelique Pandey MD [facility]  
3/14/2019  5:00 PM Waalbaroe Asper [facility]  
3/18/2019  5:00 PM Afia Mccarthy [facility]  
3/20/2019  5:00 PM Chaitanya Hughes, PT [facility]  
3/25/2019  5:00 PM Amandae Asper [facility]  
3/28/2019  5:00 PM Chaitanya Hughes, PT [facility]  
4/1/2019  5:00 PM Waalbaroe Asper [facility]  
4/4/2019  4:30 PM Chaitanya Hughes, PT [facility]  
4/11/2019  5:00 PM Merlin, [address] [facility]  
4/15/2019  5:00 PM Afia Mccarthy [facility]

## 2019-03-04 ENCOUNTER — HOSPITAL ENCOUNTER (OUTPATIENT)
Dept: PHYSICAL THERAPY | Age: 59
Discharge: HOME | End: 2019-03-04
Payer: COMMERCIAL

## 2019-03-04 PROCEDURE — 97140 MANUAL THERAPY 1/> REGIONS: CPT

## 2019-03-04 PROCEDURE — 97110 THERAPEUTIC EXERCISES: CPT

## 2019-03-04 NOTE — PROGRESS NOTES
PT DAILY TREATMENT NOTE 10-18 Patient Name: Tyrone Bee Date:3/4/2019 : 1960 [x]  Patient  Verified Payor: Meg Lombardi / Plan: [address] RPN / Product Type: Commerical / In time: 5:32    Out time: 6:31 Total Treatment Time (min): 59 Visit #: 5 of 18 Medicare/BCBS Only Total Timed Codes (min):  49 1:1 Treatment Time:  44 Treatment Area: Adhesive capsulitis of right shoulder [M75.01] SUBJECTIVE Pain Level (0-10 scale): 0 Any medication changes, allergies to medications, adverse drug reactions, diagnosis change, or new procedure performed?: [x] No    [] Yes (see summary sheet for update) Subjective functional status/changes:   [] No changes reported Pt reports having no changes since the last session. OBJECTIVE Modality rationale: decrease pain and increase tissue extensibility to improve the patients ability to tolerate ADLs Min Type Additional Details  
 [] Estim:  []Unatt       []IFC  []Premod []Other:  []w/ice   []w/heat Position: Location:  
 [] Estim: []Att    []TENS instruct  []NMES []Other:  []w/US   []w/ice   []w/heat Position: Location:  
 []  Traction: [] Cervical       []Lumbar 
                     [] Prone          []Supine []Intermittent   []Continuous Lbs: 
[] before manual 
[] after manual  
 []  Ultrasound: []Continuous   [] Pulsed []1MHz   []3MHz Location: 
W/cm2:  
 []  Iontophoresis with dexamethasone Location: [] Take home patch  
[] In clinic  
10 []  Ice     [x]  heat 
[]  Ice massage 
[]  Laser  
[]  Anodyne Position: reclined Location: right shoulder  
 []  Laser with stim 
[]  Other: Position: Location:  
 []  Vasopneumatic Device Pressure:       [] lo [] med [] hi  
Temperature: [] lo [] med [] hi  
[] Skin assessment post-treatment:  []intact []redness- no adverse reaction 
  []redness  adverse reaction: 37 min Therapeutic Exercise:  [x] See flow sheet :   
Rationale: increase ROM and increase strength to improve the patients ability to improve ease with overhead lifting 
  
12 min Manual Therapy: right GHJ long axis distraction, grade 3-4 right GHJ inferior mobs at end range PROM ABD, grade right GHJ 3-4 PA mobs, PROM right shoulder flex/ABD/ER/IR after performing above   
Rationale: decrease pain, increase ROM and increase tissue extensibility to improve ease with work-related ADLs With 
 [] TE 
 [] TA 
 [] neuro 
 [] other: Patient Education: [x] Review HEP [] Progressed/Changed HEP based on:  
[] positioning   [] body mechanics   [] transfers   [] heat/ice application   
[] other:   
 
Other Objective/Functional Measures: Limited right shoulder PROM IR>ER noted per visual observation during manual today. Pain Level (0-10 scale) post treatment: 0 
 
ASSESSMENT/Changes in Function: Reported no pain post session. Educated pt to avoid pain with exercises and with activities at home. Limited right shoulder ER AROM and increased fatigue noted with s/l right shoulder ER exercise with 2# weight. Continue POC as tolerated. Patient will continue to benefit from skilled PT services to modify and progress therapeutic interventions, address functional mobility deficits, address ROM deficits, address strength deficits, analyze and address soft tissue restrictions, analyze and cue movement patterns, analyze and modify body mechanics/ergonomics, assess and modify postural abnormalities and instruct in home and community integration to attain remaining goals. []  See Plan of Care 
[]  See progress note/recertification 
[]  See Discharge Summary Progress towards goals / Updated goals: 
Short Term Goals: To be accomplished in 1 weeks: 1.  Patient will become proficient in her HEP and will be compliant in performing that program. 
Evaluation:  Patient given a written/illustrated HEP. Current: Met, HEP performance reported as prescribed, 2/7/2019 
  
Long Term Goals: To be accomplished in 6 weeks: 1. Patient's pain level will be 1-2/10 with activity in order to improve patient's ability to perform normal ADLs. Evaluation:  Pain 0-8/10 Current: Remains, Pain Levels = 0/10 - 8/10. 2/11/19. 
2. Patient will demonstrate 0-170 flexion; 0-150 abduction; 0-70 IR; 0-70 ER AROM right shoulder to increase ease of ADLs. Evaluation:  PROM flexion 0-138; abduction 0-90; IR 0-26; ER 0-20. Current: Progressing, Flexion 0-145, Abduction 0-125,  IR (90 deg abd) 32deg, ER (90 deg abd) 52 deg, 3/1/2019 3. Patient will increase FOTO score to 70 to increase functional mobility. Evaluation: FOTO = 58 Current: Regressing, FOTO = 55, 2/13/2019 4. Patient will be able to lift and put her carry on luggage into an overhead bin on an airplane order to perform normal ADLs. Evaluation:  Unable to lift up her carryon and has to check her bags. Current:  Progressing, She can put her backpack up in the overhead bin now. Still checks her other carryon bag.  2/11/19. PLAN [x]  Upgrade activities as tolerated     [x]  Continue plan of care [x]  Update interventions per flow sheet      
[]  Discharge due to:_ 
[]  Other:_ Sara Harris PT 3/4/2019  5:59 PM 
 
Future Appointments Date Time Provider Andre Mccraryi 3/4/2019  5:30 PM Pinky Ludwig PT [facility]  
3/6/2019  5:30 PM Joellen Marshall [facility]  
3/8/2019  7:30 AM [facility]  
3/13/2019  5:00 PM Jason Sheth PT [facility]  
3/14/2019  8:00 AM Bry Santiago MD [place] [id number]  
3/14/2019  5:00 PM Joellen Marshall [facility]  
3/18/2019  5:00 PM Coral Rolando [id number]  
3/20/2019  5:00 PM Jason Sheth PT [facility]  
3/25/2019  5:00 PM Joellen Marshall [facility]  
3/28/2019  5:00 PM Jason Sheth PT [facility]  
4/1/2019  5:00 PM Joellen Marshall [facility]  
 4/4/2019  4:30 PM Merlin, [address] [facility]  
4/11/2019  5:00 PM Merlin, [address] [facility]  
4/15/2019  5:00 PM Ladan Casanova [facility]

## 2019-03-06 ENCOUNTER — HOSPITAL ENCOUNTER (OUTPATIENT)
Dept: PHYSICAL THERAPY | Age: 59
Discharge: HOME | End: 2019-03-06
Payer: COMMERCIAL

## 2019-03-06 PROCEDURE — 97112 NEUROMUSCULAR REEDUCATION: CPT

## 2019-03-06 PROCEDURE — 97140 MANUAL THERAPY 1/> REGIONS: CPT

## 2019-03-06 PROCEDURE — 97110 THERAPEUTIC EXERCISES: CPT

## 2019-03-06 NOTE — PROGRESS NOTES
PT DAILY TREATMENT NOTE 10-18    Patient Name: Patricia Garcia  Date:3/6/2019  : 1960  [x]  Patient  Verified  Payor: Tanvir Chang / Plan: [id number] [facility] RPN / Product Type: Commerical /    In time:527  Out time:625  Total Treatment Time (min): 58  Visit #: 6 of 18    Medicare/BCBS Only   Total Timed Codes (min):  48 1:1 Treatment Time:  38       Treatment Area:  Adhesive capsulitis of right shoulder [M75.01]    SUBJECTIVE  Pain Level (0-10 scale): 0  Any medication changes, allergies to medications, adverse drug reactions, diagnosis change, or new procedure performed?: [x] No    [] Yes (see summary sheet for update)  Subjective functional status/changes:   [] No changes reported  Pt notes she continues to noticed improvements in her ROM    OBJECTIVE    Modality rationale: decrease pain and increase tissue extensibility to improve the patients ability to increase ease with self care tasks   Min Type Additional Details    [] Estim:  []Unatt       []IFC  []Premod                        []Other:  []w/ice   []w/heat  Position:  Location:    [] Estim: []Att    []TENS instruct  []NMES                    []Other:  []w/US   []w/ice   []w/heat  Position:  Location:    []  Traction: [] Cervical       []Lumbar                       [] Prone          []Supine                       []Intermittent   []Continuous Lbs:  [] before manual  [] after manual    []  Ultrasound: []Continuous   [] Pulsed                           []1MHz   []3MHz W/cm2:  Location:    []  Iontophoresis with dexamethasone         Location: [] Take home patch   [] In clinic   10 []  Ice     [x]  heat  []  Ice massage  []  Laser   []  Anodyne Position:sitting  Location:right shoulder    []  Laser with stim  []  Other:  Position:  Location:    []  Vasopneumatic Device Pressure:       [] lo [] med [] hi   Temperature: [] lo [] med [] hi   [] Skin assessment post-treatment:  []intact []redness- no adverse reaction    []redness  adverse reaction:       18 min Therapeutic Exercise:  [x] See flow sheet :   Rationale: increase ROM and increase strength to improve the patients ability to increase ease with overhead reaching     20 min Neuromuscular Re-education:  [x]  See flow sheet :scapular stabilization exercises   Rationale: increase strength, improve coordination and increase proprioception  to improve the patients ability to increase ease with ADLs    10 min Manual Therapy:  Inferior and posterior mobs grade 3-4, mobs with movement, shoulder PROM in all directions   Rationale: decrease pain, increase ROM and increase tissue extensibility to increase ease with self care tasks            With   [] TE   [] TA   [] neuro   [] other: Patient Education: [x] Review HEP    [] Progressed/Changed HEP based on:   [] positioning   [] body mechanics   [] transfers   [] heat/ice application    [] other:           Pain Level (0-10 scale) post treatment: 0    ASSESSMENT/Changes in Function: Pt continues to require cues for upper trap recruitment during prone rows and T's    Patient will continue to benefit from skilled PT services to modify and progress therapeutic interventions, address functional mobility deficits, address ROM deficits, address strength deficits, analyze and address soft tissue restrictions, analyze and cue movement patterns, analyze and modify body mechanics/ergonomics, assess and modify postural abnormalities and instruct in home and community integration to attain remaining goals. [x]  See Plan of Care  []  See progress note/recertification  []  See Discharge Summary         Progress towards goals / Updated goals:  Short Term Goals: To be accomplished in 1 weeks:  1.  Patient will become proficient in her HEP and will be compliant in performing that program.  Evaluation:  Patient given a written/illustrated HEP. Current: Met, HEP performance reported as prescribed, 2/7/2019     Long Term Goals: To be accomplished in 6 weeks:  1.  Patient's pain level will be 1-2/10 with activity in order to improve patient's ability to perform normal ADLs. Evaluation:  Pain 0-8/10  Current: Progressing, pain: 0-3/10 3/6/19  2. Patient will demonstrate 0-170 flexion; 0-150 abduction; 0-70 IR; 0-70 ER AROM right shoulder to increase ease of ADLs. Evaluation:  PROM flexion 0-138; abduction 0-90; IR 0-26; ER 0-20. Current: Progressing, Flexion 0-145, Abduction 0-125,  IR (90 deg abd) 32deg, ER (90 deg abd) 52 deg, 3/1/2019  3. Patient will increase FOTO score to 70 to increase functional mobility. Evaluation: FOTO = 58  Current: Regressing, FOTO = 55, 2/13/2019  4. Patient will be able to lift and put her carry on luggage into an overhead bin on an airplane order to perform normal ADLs. Evaluation:  Unable to lift up her carryon and has to check her bags. Current:  Progressing, She can put her backpack up in the overhead bin now. Still checks her other carryon bag.  2/11/19.         PLAN  [x]  Upgrade activities as tolerated     [x]  Continue plan of care  []  Update interventions per flow sheet       []  Discharge due to:_  []  Other:_      Davy Libman 3/6/2019  5:58 PM    Future Appointments   Date Time Provider Andre Sibley   3/8/2019  7:30 AM Lesa Snow PT [facility]   3/13/2019  5:00 PM Lesa Snow PT [facility]   3/14/2019  8:00 AM MD [place] [address]   3/14/2019  5:00 PM Rosheria Sink [facility]   3/18/2019  5:00 PM Rozanna Sink [facility]   3/20/2019  5:00 PM Lesa Snow PT [facility]   3/25/2019  5:00 PM Roeddienna Sink [facility]   3/28/2019  5:00 PM Lesa Callas, PT [facility]   4/1/2019  5:00 PM Rozanna Sink [facility]   4/4/2019  4:30 PM Lesa Callas, PT [facility]   4/11/2019  5:00 PM Merlin, [address] [facility]   4/15/2019  5:00 PM Rosheria Sink [facility]

## 2019-03-08 ENCOUNTER — HOSPITAL ENCOUNTER (OUTPATIENT)
Dept: PHYSICAL THERAPY | Age: 59
Discharge: HOME | End: 2019-03-08
Payer: COMMERCIAL

## 2019-03-08 PROCEDURE — 97110 THERAPEUTIC EXERCISES: CPT

## 2019-03-08 PROCEDURE — 97140 MANUAL THERAPY 1/> REGIONS: CPT

## 2019-03-08 NOTE — PROGRESS NOTES
PT DAILY TREATMENT NOTE 10-18    Patient Name: Missy Gonzales  Date:3/8/2019  : 1960  [x]  Patient  Verified  Payor: Frank Zapata / Plan: Tae Rater RPN / Product Type: Commerical /    In time:727  Out time:836  Total Treatment Time (min): 69  Visit #: 7 of 18    Medicare/BCBS Only   Total Timed Codes (min):  59 1:1 Treatment Time:  25       Treatment Area: Adhesive capsulitis of right shoulder [M75.01]    SUBJECTIVE  Pain Level (0-10 scale): 0  Any medication changes, allergies to medications, adverse drug reactions, diagnosis change, or new procedure performed?: [x] No    [] Yes (see summary sheet for update)  Subjective functional status/changes:   [] No changes reported  Patient reports no adverse response to last treatment session.      OBJECTIVE    Modality rationale: decrease inflammation and decrease pain to improve the patients ability to improve ease with sleep   Min Type Additional Details   10 []  Ice     [x]  heat  []  Ice massage Position: REclined  Location: Left Shoulder, Post-tx     49 min Therapeutic Exercise:  [x] See flow sheet : Emphasis placed on improving available shoulder AROM and strength   Rationale: increase ROM and increase strength to improve the patients ability to improve ease with overhead lifting     10 min Manual Therapy:    Supine, Right GH Lateral Grade II-III Mobilization (OPP)  Supine, Right GH Inferior Grade II-III Mobilization (OPP, inc deg abd)  Supine, Right GH AP Grade II-III Mobilization (OPP)  Supine, Right Shoulder Passive Physiological Mobilization - Flex/Abd/Scaption   Rationale: decrease pain, increase ROM and increase tissue extensibility to improve ease with work-related ADLs          With   [] TE   [] TA   [] neuro   [] other: Patient Education: [x] Review HEP    [] Progressed/Changed HEP based on:   [] positioning   [] body mechanics   [] transfers   [] heat/ice application    [] other:      Other Objective/Functional Measures: Hypomobile posterior and inferior glenohumeral capsular mobility. Pain Level (0-10 scale) post treatment: 0    ASSESSMENT/Changes in Function: Progression of within clinic treatment to promote improvement in overhead strengthening into increasing degrees of elevation with patient demonstrating improved tolerance to glenohumeral mobilizations into increasing degrees of elevation. Patient will continue to benefit from skilled PT services to modify and progress therapeutic interventions, address functional mobility deficits, address ROM deficits, address strength deficits, analyze and address soft tissue restrictions and analyze and cue movement patterns to attain remaining goals. []  See Plan of Care  []  See progress note/recertification  []  See Discharge Summary         Progress towards goals / Updated goals:    Short Term Goals: To be accomplished in 1 weeks:  1.  Patient will become proficient in her HEP and will be compliant in performing that program.  Evaluation:  Patient given a written/illustrated HEP. Current: Met, HEP performance reported as prescribed, 2/7/2019     Long Term Goals: To be accomplished in 6 weeks:  1. Patient's pain level will be 1-2/10 with activity in order to improve patient's ability to perform normal ADLs. Evaluation:  Pain 0-8/10  Current: Progressing, pain: 0-3/10 3/6/19  2. Patient will demonstrate 0-170 flexion; 0-150 abduction; 0-70 IR; 0-70 ER AROM right shoulder to increase ease of ADLs. Evaluation:  PROM flexion 0-138; abduction 0-90; IR 0-26; ER 0-20. Current: Progressing, Flexion 0-145, Abduction 0-125,  IR (90 deg abd) 32deg, ER (90 deg abd) 52 deg, 3/1/2019  3. Patient will increase FOTO score to 70 to increase functional mobility. Evaluation: FOTO = 58  Current: Regressing, FOTO = 55, 2/13/2019  4. Patient will be able to lift and put her carry on luggage into an overhead bin on an airplane order to perform normal ADLs.   Evaluation:  Unable to lift up her carryon and has to check her bags. Current:  Progressing, She can put her backpack up in the overhead bin now. Still checks her other carryon bag.  2/11/19.     PLAN  [x]  Upgrade activities as tolerated     [x]  Continue plan of care  []  Update interventions per flow sheet       []  Discharge due to:_  []  Other:_      Micaela Rodrigues, PT 3/8/2019  6:53 AM    Future Appointments   Date Time Provider Andre Maryjo   3/8/2019  7:30 AM Earvin Po, PT [facility]   3/13/2019  5:00 PM Earvin Po, PT [facility]   3/14/2019  8:00 AM Caterina Vilchis MD [place] [id number]   3/14/2019  5:00 PM Murtaza Jeffers [facility]   3/18/2019  5:00 PM Murtaza Jeffers [facility]   3/20/2019  5:00 PM Earvin Po, PT [facility]   3/25/2019  5:00 PM Murtaza Jeffers [facility]   3/28/2019  5:00 PM Kiranvin Po, PT [facility]   4/1/2019  5:00 PM Murtaza Jeffers [facility]   4/4/2019  4:30 PM Beatriz Po, PT [facility]   4/11/2019  5:00 PM Merlin, [address] [facility]   4/15/2019  5:00 PM Murtaza Jeffers [facility]

## 2019-03-13 ENCOUNTER — HOSPITAL ENCOUNTER (OUTPATIENT)
Dept: PHYSICAL THERAPY | Age: 59
Discharge: HOME | End: 2019-03-13
Payer: COMMERCIAL

## 2019-03-13 PROCEDURE — 97110 THERAPEUTIC EXERCISES: CPT

## 2019-03-13 PROCEDURE — 97140 MANUAL THERAPY 1/> REGIONS: CPT

## 2019-03-13 NOTE — PROGRESS NOTES
PT DAILY TREATMENT NOTE 10-18    Patient Name: Shine Odell  Date:3/13/2019  : 1960  [x]  Patient  Verified  Payor: Rdoolfo Bridges / Plan: Jared Montesinos RPN / Product Type: Commerical /    In time:457  Out time:557  Total Treatment Time (min): 60  Visit #: 8 of 18    Medicare/BCBS Only   Total Timed Codes (min):  50 1:1 Treatment Time:  50       Treatment Area: Adhesive capsulitis of right shoulder [M75.01]    SUBJECTIVE  Pain Level (0-10 scale): 1  Any medication changes, allergies to medications, adverse drug reactions, diagnosis change, or new procedure performed?: [x] No    [] Yes (see summary sheet for update)  Subjective functional status/changes:   [] No changes reported  Patient reports slight soreness to the supero-anterior aspect of the right shoulder after last treatment session.     OBJECTIVE      Modality rationale: decrease inflammation and decrease pain to improve the patients ability to improve ease with sleep   Min Type Additional Details    10 []  Ice     [x]  heat  []  Ice massage Position: REclined  Location: Left Shoulder, Post-tx       40 min Therapeutic Exercise:  [x] See flow sheet : Emphasis placed on improving available shoulder AROM and strength   Rationale: increase ROM and increase strength to improve the patients ability to improve ease with overhead lifting     10 min Manual Therapy:    Supine, Right GH Lateral Grade II-III Mobilization (OPP)  Supine, Right GH Inferior Grade II-III Mobilization (OPP, inc deg abd)  Supine, Right GH AP Grade II-III Mobilization (OPP)  Supine, Right Shoulder Passive Physiological Mobilization - Flex/Abd/Scaption   Rationale: decrease pain, increase ROM and increase tissue extensibility to improve ease with work-related ADLs        With   [] TE   [] TA   [] neuro   [] other: Patient Education: [x] Review HEP    [] Progressed/Changed HEP based on:   [] positioning   [] body mechanics   [] transfers   [] heat/ice application    [] other:      Other Objective/Functional Measures: See goals below. Pain Level (0-10 scale) post treatment: 0    ASSESSMENT/Changes in Function: Patient has demonstrated a gradual improvement in right shoulder functional mobility with AROM as follows: Flexion 130 deg, Abduction 100 deg, Functional ER Occiput, Functional IR right SIJ. Patient continues to demonstrate poor scapulohumeral rhythm with UE rhythm >90 degrees but patient subjectively with improved ease with reaching onto overhead shelves. Have progressed functional overhead exercises within available ROM to improve patient ease with functional use of UE. To progress to completion of independent HEP as discharge deemed appropriate. Patient will continue to benefit from skilled PT services to modify and progress therapeutic interventions, address functional mobility deficits, address ROM deficits, address strength deficits, analyze and address soft tissue restrictions, analyze and cue movement patterns and analyze and modify body mechanics/ergonomics to attain remaining goals. []  See Plan of Care  [x]  See progress note/recertification  []  See Discharge Summary         Progress towards goals / Updated goals:    Short Term Goals: To be accomplished in 1 weeks:  1.  Patient will become proficient in her HEP and will be compliant in performing that program.  Evaluation:  Patient given a written/illustrated HEP. Current: Met, HEP performance reported as prescribed, 2/7/2019     Long Term Goals: To be accomplished in 6 weeks:  1. Patient's pain level will be 1-2/10 with activity in order to improve patient's ability to perform normal ADLs. Evaluation:  Pain 0-8/10  Current: Progressing, Pain Levels = 0/10 - 3/10, 3/6/19  2. Patient will demonstrate 0-170 flexion; 0-150 abduction; 0-70 IR; 0-70 ER AROM right shoulder to increase ease of ADLs. Evaluation:  PROM flexion 0-138; abduction 0-90; IR 0-26; ER 0-20.   Current: Progressing, Flexion 0-145, Abduction 0-125,  IR (90 deg abd) 32deg, ER (90 deg abd) 52 deg, 3/1/2019  3. Patient will increase FOTO score to 70 to increase functional mobility. Evaluation: FOTO = 58  Current: Progressing, FOTO = 64, 3/13/2019  4. Patient will be able to lift and put her carry on luggage into an overhead bin on an airplane order to perform normal ADLs. Evaluation:  Unable to lift up her carryon and has to check her bags. Current:  Progressing, She can put her backpack up in the overhead bin now. Still checks her other carryon bag.  2/11/19. Updated Goals: To be achieved in 4 weeks  1. Patient will demonstrate right shoulder flexion and abduction AROM >/= 140 degrees to improve ease with reaching onto overhead shelves.   3/13/2019: Right Shoulder Flexion 130 deg, Right Shoulder Abduction 100 deg    PLAN  [x]  Upgrade activities as tolerated     [x]  Continue plan of care  []  Update interventions per flow sheet       []  Discharge due to:_  []  Other:_      Eri Bravo PT 3/13/2019  12:53 PM    Future Appointments   Date Time Provider Andre Mccraryi   3/13/2019  5:00 PM Stacey De La Rosa, [address] [facility]   3/14/2019  8:00 AM Param Eckert MD [facility]   3/14/2019  5:00 PM Atilio Murphy [facility]   3/18/2019  5:00 PM Atilio Murphy [facility]   3/20/2019  5:00 PM Jv Marks PT [facility]   3/27/2019  5:00 PM [facility]OP[facility]IA [facility]   3/28/2019  5:00 PM Jv Marks PT [facility]   4/1/2019  5:00 PM Atilio [facility]   4/4/2019  4:30 PM Stacey De La Rosa, [address] [facility]   4/11/2019  5:00 PM Merlin, [address] [facility]   4/15/2019  5:00 PM Atilio Murphy [facility]

## 2019-03-13 NOTE — PROGRESS NOTES
[facility]              [address] [facility]        [place], [address] [place]   [phone] fax    Progress Note  Patient name: Ada Watters Start of Care: 2019   Referral source: Bry Santiago,* : 1960   Medical/Treatment Diagnosis: Adhesive capsulitis of right shoulder [M75.01]  Payor: MIRIAN / Plan: Belkis LYMAN / Product Type: Commerical /  Onset Date:2018     Prior Hospitalization: see medical history Provider#: [id number]   Medications: Verified on Patient Summary List    Comorbidities: Arthritis, HBP.   Prior Level of Function: Independent, traveled a lot for work. Visits from Start of Care: 16    Missed Visits: 0    Established Goals:      Short Term Goals: To be accomplished in 1 weeks:  1.  Patient will become proficient in her HEP and will be compliant in performing that program.  Evaluation:  Patient given a written/illustrated HEP. At PN: Met, HEP performance reported as prescribed     Long Term Goals: To be accomplished in 6 weeks:  1. Patient's pain level will be 1-2/10 with activity in order to improve patient's ability to perform normal ADLs. Evaluation:  Pain 0-8/10  At PN: Progressing, Pain Levels = 0/10 - 3/10  2. Patient will demonstrate 0-170 flexion; 0-150 abduction; 0-70 IR; 0-70 ER AROM right shoulder to increase ease of ADLs. Evaluation:  PROM flexion 0-138; abduction 0-90; IR 0-26; ER 0-20. At PN: Progressing, Flexion 0-145, Abduction 0-125,  IR (90 deg abd) 32deg, ER (90 deg abd) 52 deg  3. Patient will increase FOTO score to 70 to increase functional mobility. Evaluation: FOTO = 58  At PN: Progressing, FOTO = 64  4. Patient will be able to lift and put her carry on luggage into an overhead bin on an airplane order to perform normal ADLs. Evaluation:  Unable to lift up her carryon and has to check her bags. At PN:  Progressing, She can put her backpack up in the overhead bin now.  Still checks her other carryon bag    Key Functional Changes: See above goals. Updated Goals: To be achieved in 4 weeks  1. Patient will demonstrate right shoulder flexion and abduction AROM >/= 140 degrees to improve ease with reaching onto overhead shelves. 3/13/2019: Right Shoulder Flexion 130 deg, Right Shoulder Abduction 100 deg    ASSESSMENT/RECOMMENDATIONS:    Patient has demonstrated a gradual improvement in right shoulder functional mobility with AROM as follows: Flexion 130 deg, Abduction 100 deg, Functional ER Occiput, Functional IR right SIJ. Patient continues to demonstrate poor scapulohumeral rhythm with UE rhythm >90 degrees but patient subjectively with improved ease with reaching onto overhead shelves. Have progressed functional overhead exercises within available ROM to improve patient ease with functional use of UE. To progress to completion of independent HEP as discharge deemed appropriate. Patient will continue to benefit from skilled PT services to modify and progress therapeutic interventions, address functional mobility deficits, address ROM deficits, address strength deficits, analyze and address soft tissue restrictions, analyze and cue movement patterns and analyze and modify body mechanics/ergonomics to attain remaining goals.     [x]Continue therapy per initial plan/protocol at a frequency of  2 x per week for 6 weeks  []Continue therapy with the following recommended changes:_____________________      _____________________________________________________________________  []Discontinue therapy progressing towards or have reached established goals  []Discontinue therapy due to lack of appreciable progress towards goals  []Discontinue therapy due to lack of attendance or compliance  []Await Physician's recommendations/decisions regarding therapy  []Other:________________________________________________________________    Thank you for this referral.    Joel Khan, PT 3/13/2019 12:54 PM  NOTE TO PHYSICIAN:  PLEASE COMPLETE THE ORDERS BELOW AND   FAX TO [facility]: [phone]  If you are unable to process this request in 24 hours please contact our office: [phone]    []  I have read the above report and request that my patient continue as recommended. []  I have read the above report and request that my patient continue therapy with the following changes/special instructions:________________________________________  []I have read the above report and request that my patient be discharged from therapy.     [de-identified] Signature:____________Date:_________TIME:________    [employer], Date and Time must be completed for valid certification **

## 2019-03-14 ENCOUNTER — OFFICE VISIT (OUTPATIENT)
Dept: ORTHOPEDIC SURGERY | Age: 59
End: 2019-03-14

## 2019-03-14 ENCOUNTER — HOSPITAL ENCOUNTER (OUTPATIENT)
Dept: PHYSICAL THERAPY | Age: 59
Discharge: HOME | End: 2019-03-14
Payer: COMMERCIAL

## 2019-03-14 VITALS
HEIGHT: 67 IN | OXYGEN SATURATION: 100 % | HEART RATE: 73 BPM | WEIGHT: 174 LBS | DIASTOLIC BLOOD PRESSURE: 51 MMHG | RESPIRATION RATE: 16 BRPM | BODY MASS INDEX: 27.31 KG/M2 | TEMPERATURE: 98.6 F | SYSTOLIC BLOOD PRESSURE: 119 MMHG

## 2019-03-14 DIAGNOSIS — M75.01 ADHESIVE CAPSULITIS OF RIGHT SHOULDER: Primary | ICD-10-CM

## 2019-03-14 PROCEDURE — 97140 MANUAL THERAPY 1/> REGIONS: CPT

## 2019-03-14 PROCEDURE — 97110 THERAPEUTIC EXERCISES: CPT

## 2019-03-14 NOTE — PROGRESS NOTES
PT DAILY TREATMENT NOTE 10-18    Patient Name: Marino Bernabe  Date:3/14/2019  : 1960  [x]  Patient  Verified  Payor: Carley Hayward / Plan: Rajesh Heater RPN / Product Type: Commerical /    In time:457  Out time: 557  Total Treatment Time (min): 60  Visit #:  of 18      Treatment Area:  Adhesive capsulitis of right shoulder [M75.01]    SUBJECTIVE  Pain Level (0-10 scale): 0  Any medication changes, allergies to medications, adverse drug reactions, diagnosis change, or new procedure performed?: [x] No    [] Yes (see summary sheet for update)  Subjective functional status/changes:   [] No changes reported  Pt reports some muscle soreness following last visit    OBJECTIVE    Modality rationale: decrease pain and increase tissue extensibility to improve the patients ability to increase ease with ADLs   Min Type Additional Details    [] Estim:  []Unatt       []IFC  []Premod                        []Other:  []w/ice   []w/heat  Position:  Location:    [] Estim: []Att    []TENS instruct  []NMES                    []Other:  []w/US   []w/ice   []w/heat  Position:  Location:    []  Traction: [] Cervical       []Lumbar                       [] Prone          []Supine                       []Intermittent   []Continuous Lbs:  [] before manual  [] after manual    []  Ultrasound: []Continuous   [] Pulsed                           []1MHz   []3MHz W/cm2:  Location:    []  Iontophoresis with dexamethasone         Location: [] Take home patch   [] In clinic   10 []  Ice     [x]  heat  []  Ice massage  []  Laser   []  Anodyne Position:sitting  Location:right shoulder    []  Laser with stim  []  Other:  Position:  Location:    []  Vasopneumatic Device Pressure:       [] lo [] med [] hi   Temperature: [] lo [] med [] hi   [] Skin assessment post-treatment:  []intact []redness- no adverse reaction    []redness  adverse reaction:       40 min Therapeutic Exercise:  [x] See flow sheet :   Rationale: increase ROM and increase strength to improve the patients ability to increase ease with self care tasks     10 min Manual Therapy:  Shoulder PROM in all directions, posterior and inferior mobs grade 3-4   Rationale: decrease pain, increase ROM and increase tissue extensibility to increase ease with overhead reaching           With   [] TE   [] TA   [] neuro   [] other: Patient Education: [x] Review HEP    [] Progressed/Changed HEP based on:   [] positioning   [] body mechanics   [] transfers   [] heat/ice application    [] other:           Pain Level (0-10 scale) post treatment: 0    ASSESSMENT/Changes in Function: Held on progression due to pt reports of soreness following last visit. Patient will continue to benefit from skilled PT services to modify and progress therapeutic interventions, address functional mobility deficits, address ROM deficits, address strength deficits, analyze and address soft tissue restrictions, analyze and cue movement patterns, analyze and modify body mechanics/ergonomics, assess and modify postural abnormalities and instruct in home and community integration to attain remaining goals. [x]  See Plan of Care  []  See progress note/recertification  []  See Discharge Summary         Progress towards goals / Updated goals:  Short Term Goals: To be accomplished in 1 weeks:  1.  Patient will become proficient in her HEP and will be compliant in performing that program.  Evaluation:  Patient given a written/illustrated HEP. Current: Met, HEP performance reported as prescribed, 2/7/2019     Long Term Goals: To be accomplished in 6 weeks:  1. Patient's pain level will be 1-2/10 with activity in order to improve patient's ability to perform normal ADLs. Evaluation:  Pain 0-8/10  Current: Progressing, Pain Levels = 0/10 - 3/10, 3/6/19  2. Patient will demonstrate 0-170 flexion; 0-150 abduction; 0-70 IR; 0-70 ER AROM right shoulder to increase ease of ADLs.   Evaluation:  PROM flexion 0-138; abduction 0-90; IR 0-26; ER 0-20.  Current: Progressing, Flexion 0-145, Abduction 0-125,  IR (90 deg abd) 32deg, ER (90 deg abd) 52 deg, 3/1/2019  3. Patient will increase FOTO score to 70 to increase functional mobility. Evaluation: FOTO = 58  Current: Progressing, FOTO = 64, 3/13/2019  4. Patient will be able to lift and put her carry on luggage into an overhead bin on an airplane order to perform normal ADLs. Evaluation:  Unable to lift up her carryon and has to check her bags. Current:  Progressing, She can put her backpack up in the overhead bin now. Still checks her other carryon bag.  2/11/19.     Updated Goals: To be achieved in 4 weeks  1. Patient will demonstrate right shoulder flexion and abduction AROM >/= 140 degrees to improve ease with reaching onto overhead shelves.   3/13/2019: Right Shoulder Flexion 130 deg, Right Shoulder Abduction 100 deg           PLAN  [x]  Upgrade activities as tolerated     [x]  Continue plan of care  []  Update interventions per flow sheet       []  Discharge due to:_  []  Other:_      Carlyle Kussmaul 3/14/2019  4:54 PM    Future Appointments   Date Time Provider Andre Sibley   3/14/2019  5:00 PM Anderson Child [facility]   3/27/2019  5:00 PM Merritt Citrin, PT [facility]   3/28/2019  5:00 PM Merritt Citrin, PT [facility]   4/1/2019  5:00 PM Anderson Child [facility]   4/4/2019  4:30 PM Merritt Camilain, PT [facility]   4/11/2019  5:00 PM Kelsey Boyer [facility]   4/15/2019  5:00 PM Anderson Child [facility]

## 2019-03-14 NOTE — PROGRESS NOTES
1. Have you been to the ER, urgent care clinic since your last visit? Hospitalized since your last visit? No    2. Have you seen or consulted any other health care providers outside of the [address] [place] [address] since your last visit? Include any pap smears or colon screening.  No

## 2019-03-14 NOTE — PROGRESS NOTES
Elias Fortune  1960   Chief Complaint   Patient presents with    Shoulder Pain     RIGHT SHOULDER PAIN        HISTORY OF PRESENT ILLNESS  Elias Fortune is a 66 y.o. female who presents today for reevaluation of right shoulder pain. Patient rates pain as 4/10 today. At last OV, patient had a cortisone injection which provided good relief. She has been attending PT which has also provided good relief and increase in ROM. She still reports limitations which reaching behind. Patient denies any fever, chills, chest pain, shortness of breath or calf pain. The remainder of the review of systems is negative. There are no new illness or injuries to report since last seen in the office. There are no changes to medications, allergies, family or social history. PHYSICAL EXAM:   Visit Vitals  /51   Pulse 73   Temp 98.6 °F (37 °C) (Oral)   Resp 16   Ht 5' 7\" (1.702 m)   Wt 174 lb (78.9 kg)   SpO2 100%   BMI 27.25 kg/m²     The patient is a well-developed, well-nourished female   in no acute distress. The patient is alert and oriented times three. The patient is alert and oriented times three. Mood and affect are normal.  LYMPHATIC: lymph nodes are not enlarged and are within normal limits  SKIN: normal in color and non tender to palpation. There are no bruises or abrasions noted. NEUROLOGICAL: Motor sensory exam is within normal limits. Reflexes are equal bilaterally.  There is normal sensation to pinprick and light touch  MUSCULOSKELETAL:  Examination Right shoulder   Skin Intact   AC joint tenderness -   Biceps tenderness -   Forward flexion/Elevation    Active abduction    Glenohumeral abduction 90   External rotation ROM 60   Internal rotation ROM 30   Apprehension -   Akhils Relocation -   Jerk -   Load and Shift -   Obriens -   Speeds -   Impingement sign +   Supraspinatus/Empty Can -, 5/5   External Rotation Strength -, 5/5   Lift Off/Belly Press -, 5/5   Neurovascular Intact IMAGING: MRI of right shoulder dated 1/11/19 was reviewed and read:   IMPRESSION:  1. There is at least severe tendinosis with interstitial tearing involving the distal supraspinatus. Some undersurface and bursal surface fibrillation/tearing is possible but not convincingly so. There is no tendon retraction and there is  no muscle atrophy.    2. Possibility of nondisplaced superior anterior labral tear, versus anatomic variant.    3. Moderately pronounced AC joint arthritis with minimal bursitis.    4. Edema within the axillary recess. Though nonspecific, can be associated with adhesive capsulitis. XR right shoulder dated 12/13/18 was reviewed and read: no acute abnormalities    IMPRESSION:      ICD-10-CM ICD-9-CM    1. Adhesive capsulitis of right shoulder M75.01 726.0         PLAN:   1. The patient presents today with right shoulder pain due to adhesive capsulitis which has improved with PT and the cortisone injection. Risk factors include: n/a  2. No ultrasound exam indicated today. 3. No cortisone injection indicated today. 4. No Physical Therapy indicated today  5. No diagnostic test indicated today:   6. No durable medical equipment indicated today  7. No referral indicated today   8. No medications indicated today:   9. No Narcotic indicated today    RTC prn  Follow-up Disposition: Not on File    [facility]) as dictated by MD VENTURA Carrillo, Dr. Ross Sharif, confirm that all documentation is accurate.     Ross Sharif M.D.   [facility]

## 2019-03-18 ENCOUNTER — APPOINTMENT (OUTPATIENT)
Dept: PHYSICAL THERAPY | Age: 59
End: 2019-03-18
Payer: COMMERCIAL

## 2019-03-20 ENCOUNTER — APPOINTMENT (OUTPATIENT)
Dept: PHYSICAL THERAPY | Age: 59
End: 2019-03-20
Payer: COMMERCIAL

## 2019-03-21 ENCOUNTER — APPOINTMENT (OUTPATIENT)
Dept: PHYSICAL THERAPY | Age: 59
End: 2019-03-21
Payer: COMMERCIAL

## 2019-03-25 ENCOUNTER — APPOINTMENT (OUTPATIENT)
Dept: PHYSICAL THERAPY | Age: 59
End: 2019-03-25
Payer: COMMERCIAL

## 2019-03-27 ENCOUNTER — HOSPITAL ENCOUNTER (OUTPATIENT)
Dept: PHYSICAL THERAPY | Age: 59
Discharge: HOME | End: 2019-03-27
Payer: COMMERCIAL

## 2019-03-27 PROCEDURE — 97140 MANUAL THERAPY 1/> REGIONS: CPT

## 2019-03-27 PROCEDURE — 97110 THERAPEUTIC EXERCISES: CPT

## 2019-03-27 NOTE — PROGRESS NOTES
PT DAILY TREATMENT NOTE 10-18 Patient Name: Crissy Steve Date:3/27/2019 : 1960 [x]  Patient  Verified Payor: Husseinyogesh Amaropablito / Plan: [id number] RPN / Product Type: Commerical / In time:455  Out time:606 Total Treatment Time (min): 66 Visit #: 10 of 18 Medicare/BCBS Only Total Timed Codes (min): 56 1:1 Treatment Time:  46  
  
  
Treatment Area: Pain in right shoulder [M25.511] SUBJECTIVE Pain Level (0-10 scale): 0 Any medication changes, allergies to medications, adverse drug reactions, diagnosis change, or new procedure performed?: [x] No    [] Yes (see summary sheet for update) Subjective functional status/changes:   [] No changes reported Pt reports no pain. Has no new complaints at this time. Pt notes that she is able to reach higher now with less pain OBJECTIVE Modality rationale: decrease pain and increase tissue extensibility to improve the patients ability to increase ease with ADLs Min Type Additional Details  
 [] Estim:  []Unatt       []IFC  []Premod []Other:  []w/ice   []w/heat Position: Location:  
 [] Estim: []Att    []TENS instruct  []NMES []Other:  []w/US   []w/ice   []w/heat Position: Location:  
 []  Traction: [] Cervical       []Lumbar 
                     [] Prone          []Supine []Intermittent   []Continuous Lbs: 
[] before manual 
[] after manual  
 []  Ultrasound: []Continuous   [] Pulsed []1MHz   []3MHz W/cm2: 
Location:  
 []  Iontophoresis with dexamethasone Location: [] Take home patch  
[] In clinic  
10 []  Ice     [x]  heat 
[]  Ice massage 
[]  Laser  
[]  Anodyne Position:sitting Location:right shoulder  
 []  Laser with stim 
[]  Other:  Position: Location:  
 []  Vasopneumatic Device Pressure:       [] lo [] med [] hi  
Temperature: [] lo [] med [] hi  
[] Skin assessment post-treatment:  []intact []redness- no adverse reaction []redness  adverse reaction:  
 
 
46 min Therapeutic Exercise:  [x] See flow sheet :  
Rationale: increase ROM and increase strength to improve the patients ability to increase ease with ADls 10 min Manual Therapy:  Shoulder prom, inferior and posterior GH mobs grade 3-4, manual stretching to increase shoulder abduction Rationale: decrease pain and increase ROM to increase ease with overhead reaching With 
 [] TE 
 [] TA 
 [] neuro 
 [] other: Patient Education: [x] Review HEP [] Progressed/Changed HEP based on:  
[] positioning   [] body mechanics   [] transfers   [] heat/ice application   
[] other:   
 
  
 
Pain Level (0-10 scale) post treatment: 0 
 
ASSESSMENT/Changes in Function: Pt demonstrated with improvements in right shoulder flexion and abduction following manuals. Pt had no reports of increased pain with today's exercises. Patient will continue to benefit from skilled PT services to modify and progress therapeutic interventions, address functional mobility deficits, address ROM deficits, address strength deficits, analyze and address soft tissue restrictions, analyze and cue movement patterns, analyze and modify body mechanics/ergonomics, assess and modify postural abnormalities and instruct in home and community integration to attain remaining goals. [x]  See Plan of Care 
[]  See progress note/recertification 
[]  See Discharge Summary Progress towards goals / Updated goals: 
Short Term Goals: To be accomplished in 1 weeks: 1.  Patient will become proficient in her HEP and will be compliant in performing that program. 
Evaluation:  Patient given a written/illustrated HEP. Current: Met, HEP performance reported as prescribed, 2/7/2019 
  
Long Term Goals: To be accomplished in 6 weeks: 1. Patient's pain level will be 1-2/10 with activity in order to improve patient's ability to perform normal ADLs. Evaluation:  Pain 0-8/10 Current: Progressing, Pain Levels = 0/10 - 3/10, 3/6/19 2. Patient will demonstrate 0-170 flexion; 0-150 abduction; 0-70 IR; 0-70 ER AROM right shoulder to increase ease of ADLs. Evaluation:  PROM flexion 0-138; abduction 0-90; IR 0-26; ER 0-20. Current: Progressing, Flexion 0-145, Abduction 0-125,  IR (90 deg abd) 32deg, ER (90 deg abd) 52 deg, 3/1/2019 3. Patient will increase FOTO score to 70 to increase functional mobility. Evaluation: FOTO = 58 Current: Progressing, FOTO = 64, 3/13/2019 4. Patient will be able to lift and put her carry on luggage into an overhead bin on an airplane order to perform normal ADLs. Evaluation:  Unable to lift up her carryon and has to check her bags. Current:  Progressing, She can put her backpack up in the overhead bin now. Still checks her other carryon bag.  2/11/19. 
  
Updated Goals:  To be achieved in 4 weeks 1. Patient will demonstrate right shoulder flexion and abduction AROM >/= 140 degrees to improve ease with reaching onto overhead shelves. 3/13/2019: Right Shoulder Flexion 130 deg, Right Shoulder Abduction 100 deg Current: Progressing, right shoulder flexion: 140 deg, right shoulder ABD: 138 deg 3/27/19 PLAN [x]  Upgrade activities as tolerated     [x]  Continue plan of care 
[]  Update interventions per flow sheet      
[]  Discharge due to:_ 
[]  Other:_ Ada Kruger 3/27/2019  4:59 PM 
 
Future Appointments Date Time Provider Andre Sibley 3/27/2019  5:00 PM Tiffany Dasilva [id number]  
4/1/2019  5:00 PM Tiffany Dasilva [facility]  
4/11/2019  5:00 PM Nichol Munroe PT [facility]  
4/15/2019  5:00 PM Tiffany Dasilva [id number]

## 2019-03-28 ENCOUNTER — APPOINTMENT (OUTPATIENT)
Dept: PHYSICAL THERAPY | Age: 59
End: 2019-03-28
Payer: COMMERCIAL

## 2019-04-01 ENCOUNTER — HOSPITAL ENCOUNTER (OUTPATIENT)
Dept: PHYSICAL THERAPY | Age: 59
Discharge: HOME | End: 2019-04-01
Payer: COMMERCIAL

## 2019-04-01 PROCEDURE — 97110 THERAPEUTIC EXERCISES: CPT

## 2019-04-01 PROCEDURE — 97140 MANUAL THERAPY 1/> REGIONS: CPT

## 2019-04-01 NOTE — PROGRESS NOTES
PT DAILY TREATMENT NOTE 10-18 Patient Name: Elias Bravo Date:2019 : 1960 [x]  Patient  Verified Payor: Yamilex Yuan / Plan: [address] RPN / Product Type: Commerical / In time:455  Out time:604 Total Treatment Time (min): 69 Visit #: 48 YA 74 Medicare/BCBS Only Total Timed Codes (min):  59 1:1 Treatment Time: 49   
 
 
Treatment Area: Pain in right shoulder [M25.511] SUBJECTIVE Pain Level (0-10 scale): 0 Any medication changes, allergies to medications, adverse drug reactions, diagnosis change, or new procedure performed?: [x] No    [] Yes (see summary sheet for update) Subjective functional status/changes:   [] No changes reported Pt has no new complaints at this time OBJECTIVE Modality rationale: decrease pain to improve the patients ability to increase ease with ADls Min Type Additional Details  
 [] Estim:  []Unatt       []IFC  []Premod []Other:  []w/ice   []w/heat Position: Location:  
 [] Estim: []Att    []TENS instruct  []NMES []Other:  []w/US   []w/ice   []w/heat Position: Location:  
 []  Traction: [] Cervical       []Lumbar 
                     [] Prone          []Supine []Intermittent   []Continuous Lbs: 
[] before manual 
[] after manual  
 []  Ultrasound: []Continuous   [] Pulsed []1MHz   []3MHz W/cm2: 
Location:  
 []  Iontophoresis with dexamethasone Location: [] Take home patch  
[] In clinic  
10 []  Ice     [x]  heat 
[]  Ice massage 
[]  Laser  
[]  Anodyne Position:sitting Location:right shoulder  
 []  Laser with stim 
[]  Other:  Position: Location:  
 []  Vasopneumatic Device Pressure:       [] lo [] med [] hi  
Temperature: [] lo [] med [] hi  
[] Skin assessment post-treatment:  []intact []redness- no adverse reaction 
  []redness  adverse reaction:  
 
 
49 min Therapeutic Exercise:  [x] See flow sheet :  
 Rationale: increase ROM and increase strength to improve the patients ability to increase ease with self care tasks 10 min Manual Therapy:   Shoulder prom, inferior and posterior GH mobs grade 3-4, manual stretching to increase shoulder abduction Rationale: decrease pain, increase ROM and increase tissue extensibility to increase ease with overhead reaching With 
 [] TE 
 [] TA 
 [] neuro 
 [] other: Patient Education: [x] Review HEP [] Progressed/Changed HEP based on:  
[] positioning   [] body mechanics   [] transfers   [] heat/ice application   
[] other:   
 
Other Objective/Functional Measures:   
 
Pain Level (0-10 scale) post treatment: 0.5-1 ASSESSMENT/Changes in Function: Progressed pt in wall clocks and supine SA punch to further increase scapular stabilization. Pt demonstrated with an increase in right shoulder flexion and abduction. Patient will continue to benefit from skilled PT services to modify and progress therapeutic interventions, address functional mobility deficits, address ROM deficits, address strength deficits, analyze and address soft tissue restrictions, analyze and cue movement patterns, analyze and modify body mechanics/ergonomics, assess and modify postural abnormalities and instruct in home and community integration to attain remaining goals. []  See Plan of Care 
[]  See progress note/recertification 
[]  See Discharge Summary Progress towards goals / Updated goals: 
Short Term Goals: To be accomplished in 1 weeks: 1.  Patient will become proficient in her HEP and will be compliant in performing that program. 
Evaluation:  Patient given a written/illustrated HEP. Current: Met, HEP performance reported as prescribed, 2/7/2019 
  
Long Term Goals: To be accomplished in 6 weeks: 1. Patient's pain level will be 1-2/10 with activity in order to improve patient's ability to perform normal ADLs. Evaluation:  Pain 0-8/10 Current: Progressing, Pain Levels = 0/10 - 3/10, 3/6/19 2. Patient will demonstrate 0-170 flexion; 0-150 abduction; 0-70 IR; 0-70 ER AROM right shoulder to increase ease of ADLs. Evaluation:  PROM flexion 0-138; abduction 0-90; IR 0-26; ER 0-20. Current: Progressing, Flexion 0-145, Abduction 0-125,  IR (90 deg abd) 32deg, ER (90 deg abd) 52 deg, 3/1/2019 3. Patient will increase FOTO score to 70 to increase functional mobility. Evaluation: FOTO = 58 Current: Progressing, FOTO = 64, 3/13/2019 4. Patient will be able to lift and put her carry on luggage into an overhead bin on an airplane order to perform normal ADLs. Evaluation:  Unable to lift up her carryon and has to check her bags. Current:  Progressing, She can put her backpack up in the overhead bin now. Still checks her other carryon bag.  2/11/19. 
  
Updated Goals:  To be achieved in 4 weeks 1. Patient will demonstrate right shoulder flexion and abduction AROM >/= 140 degrees to improve ease with reaching onto overhead shelves. 3/13/2019: Right Shoulder Flexion 130 deg, Right Shoulder Abduction 100 deg Current: Progressing, right shoulder flexion: 143 deg, right shoulder ABD: 140 4/1/19 PLAN [x]  Upgrade activities as tolerated     [x]  Continue plan of care 
[]  Update interventions per flow sheet      
[]  Discharge due to:_ 
[]  Other:_ Jaylan Ash 4/1/2019  4:57 PM 
 
Future Appointments Date Time Provider Andre Sibley 4/1/2019  5:00 PM Chris Parkinson [facility]  
4/11/2019  5:00 PM Vasquez Isabel PT [facility]  
4/15/2019  5:00 PM Chris Parkinson [facility]

## 2019-04-04 ENCOUNTER — APPOINTMENT (OUTPATIENT)
Dept: PHYSICAL THERAPY | Age: 59
End: 2019-04-04
Payer: COMMERCIAL

## 2019-04-08 ENCOUNTER — APPOINTMENT (OUTPATIENT)
Dept: PHYSICAL THERAPY | Age: 59
End: 2019-04-08
Payer: COMMERCIAL

## 2019-04-11 ENCOUNTER — HOSPITAL ENCOUNTER (OUTPATIENT)
Dept: PHYSICAL THERAPY | Age: 59
Discharge: HOME | End: 2019-04-11
Payer: COMMERCIAL

## 2019-04-11 PROCEDURE — 97110 THERAPEUTIC EXERCISES: CPT

## 2019-04-11 NOTE — PROGRESS NOTES
[facility] [address] [facility] [place], [address] [place]  
[phone] fax Progress Note Patient name: Daniel Burns Start of Care: 2019 Referral source: Bob Colunga,* : 1960 Medical/Treatment Diagnosis: Pain in right shoulder [M25.511] Payor: Rakel Rizo / Plan: Annika Erazo RPN / Product Type: Commerical /  Onset Date:2018 Prior Hospitalization: see medical history Provider#: [id number] Medications: Verified on Patient Summary List   
Comorbidities: Arthritis, HBP. 
 Prior Level of Function: Independent, traveled a lot for work. Visits from Start of Care: 20    Missed Visits: 0 Established Goals:        
 
Short Term Goals: To be accomplished in 1 weeks: 1.  Patient will become proficient in her HEP and will be compliant in performing that program. 
Evaluation:  Patient given a written/illustrated HEP. At PN: Met, HEP performance reported as prescribed 
  
Long Term Goals: To be accomplished in 6 weeks: 1. Patient's pain level will be 1-2/10 with activity in order to improve patient's ability to perform normal ADLs. Evaluation:  Pain 0-8/10 At PN: Progressing, Pain Levels = 0/10 - 3/10 2. Patient will demonstrate 0-170 flexion; 0-150 abduction; 0-70 IR; 0-70 ER AROM right shoulder to increase ease of ADLs. Evaluation:  PROM flexion 0-138; abduction 0-90; IR 0-26; ER 0-20. At PN: Progressing, Flexion 0-145, Abduction 0-130,  IR (90 deg abd) 40 deg, ER (90 deg abd) 52 deg 3. Patient will increase FOTO score to 70 to increase functional mobility. Evaluation: FOTO = 58 At PN: Met, FOTO = 70 
4. Patient will be able to lift and put her carry on luggage into an overhead bin on an airplane order to perform normal ADLs. Evaluation:  Unable to lift up her carryon and has to check her bags. At PN:  Progressing, She can put her backpack up in the overhead bin now.  Still checks her other carryon bag 
  
 Updated Goals:  To be achieved in 4 weeks 1. Patient will demonstrate right shoulder flexion and abduction AROM >/= 140 degrees to improve ease with reaching onto overhead shelves. 3/13/2019: Right Shoulder Flexion 130 deg, Right Shoulder Abduction 100 deg  At PN: Progressing, Right Shoulder Flexion 145 deg, Right Shoulder Abduction 140 Key Functional Changes: See goals above. Updated Goals: Continue with unmet goals above. ASSESSMENT/RECOMMENDATIONS: 
 
Since last re-evaluation patient has demonstrated a relative plateau with respect to available shoulder AROM but subjectively has reported improved functional strength of the right UE. Patient continues to demonstrate poor scapulohumeral dissociation with elevation > 120 degrees. At this time patient to be seen for one more remaining appointment at which time patient to be placed on 30 day hold with continuation of independent HEP. Discussion with patient regarding typical continuum of adhesive capsulitis with patient educated that continued progression in ROM and strength over time with compliance with prescribed HEP. 
  
Patient will continue to benefit from skilled PT services to modify and progress therapeutic interventions, address functional mobility deficits, address ROM deficits, address strength deficits, analyze and address soft tissue restrictions and analyze and cue movement patterns to attain remaining goals. [x]Continue therapy per initial plan/protocol at a frequency of  1 x per week for 4 weeks []Continue therapy with the following recommended changes:_____________________      _____________________________________________________________________ []Discontinue therapy progressing towards or have reached established goals []Discontinue therapy due to lack of appreciable progress towards goals []Discontinue therapy due to lack of attendance or compliance []Await Physician's recommendations/decisions regarding therapy []Other:________________________________________________________________ Thank you for this referral.   
Sandy Luna, PT 4/11/2019 9:42 AM 
NOTE TO PHYSICIAN:  PLEASE COMPLETE THE ORDERS BELOW AND  
FAX TO [facility]: [phone] If you are unable to process this request in 24 hours please contact our office: [phone] []  I have read the above report and request that my patient continue as recommended. []  I have read the above report and request that my patient continue therapy with the following changes/special instructions:________________________________________ []I have read the above report and request that my patient be discharged from therapy.  
 
[de-identified] Signature:____________Date:_________TIME:________ 
 
[facility], Date and Time must be completed for valid certification **

## 2019-04-11 NOTE — PROGRESS NOTES
PT DAILY TREATMENT NOTE 10 Patient Name: Cami Kaur Date:2019 : 1960 [x]  Patient  Verified Payor: Osiris Powell / Plan: Osriis Singh RPN / Product Type: Commerical / In time:458  Out time:602 Total Treatment Time (min): 64 Visit #: 3 of 12 (signed PN 3/19/2019) Medicare/BCBS Only Total Timed Codes (min):  54 1:1 Treatment Time:  25 Treatment Area: Pain in right shoulder [M25.511] SUBJECTIVE Pain Level (0-10 scale): 0 Any medication changes, allergies to medications, adverse drug reactions, diagnosis change, or new procedure performed?: [x] No    [] Yes (see summary sheet for update) Subjective functional status/changes:   [] No changes reported Patient reports noting relative plateau in progression of shoulder AROM. OBJECTIVE Modality rationale: decrease pain and increase tissue extensibility to improve the patients ability to improve ease with sleep Min Type Additional Details 10 []  Ice     [x]  heat 
[]  Ice massage Position: Reclined Location: Right Shoulder, Post-tx 54 min Therapeutic Exercise:  [x] See flow sheet : Emphasis placed on improving available shoulder AROM and strength Rationale: increase ROM and increase strength to improve the patients ability to improve ease with overhead lifting 
      
With 
 [] TE 
 [] TA 
 [] neuro 
 [] other: Patient Education: [x] Review HEP [] Progressed/Changed HEP based on:  
[] positioning   [] body mechanics   [] transfers   [] heat/ice application   
[] other:   
 
Other Objective/Functional Measures: See goals below. Pain Level (0-10 scale) post treatment: 0 
 
ASSESSMENT/Changes in Function: Since last re-evaluation patient has demonstrated a relative plateau with respect to available shoulder AROM but subjectively has reported improved functional strength of the right UE.  Patient continues to demonstrate poor scapulohumeral dissociation with elevation > 120 degrees. At this time patient to be seen for one more remaining appointment at which time patient to be placed on 30 day hold with continuation of independent HEP. Discussion with patient regarding typical continuum of adhesive capsulitis with patient educated that continued progression in ROM and strength over time with compliance with prescribed HEP. Patient will continue to benefit from skilled PT services to modify and progress therapeutic interventions, address functional mobility deficits, address ROM deficits, address strength deficits, analyze and address soft tissue restrictions and analyze and cue movement patterns to attain remaining goals. []  See Plan of Care [x]  See progress note/recertification 
[]  See Discharge Summary Progress towards goals / Updated goals: 
 
Short Term Goals: To be accomplished in 1 weeks: 1.  Patient will become proficient in her HEP and will be compliant in performing that program. 
Evaluation:  Patient given a written/illustrated HEP. Current: Met, HEP performance reported as prescribed, 2/7/2019 
  
Long Term Goals: To be accomplished in 6 weeks: 1. Patient's pain level will be 1-2/10 with activity in order to improve patient's ability to perform normal ADLs. Evaluation:  Pain 0-8/10 Current: Progressing, Pain Levels = 0/10 - 3/10, 4/11/19 2. Patient will demonstrate 0-170 flexion; 0-150 abduction; 0-70 IR; 0-70 ER AROM right shoulder to increase ease of ADLs. Evaluation:  PROM flexion 0-138; abduction 0-90; IR 0-26; ER 0-20. Current: Progressing, Flexion 0-145, Abduction 0-130,  IR (90 deg abd) 40 deg, ER (90 deg abd) 52 deg, 4/11/2019 3. Patient will increase FOTO score to 70 to increase functional mobility. Evaluation: FOTO = 58 Current: Met, FOTO = 70, 4/11/2019 4. Patient will be able to lift and put her carry on luggage into an overhead bin on an airplane order to perform normal ADLs. Evaluation:  Unable to lift up her carryon and has to check her bags. Current:  Progressing, She can put her backpack up in the overhead bin now. Still checks her other carryon bag.  2/11/19. 
  
Updated Goals:  To be achieved in 4 weeks 1. Patient will demonstrate right shoulder flexion and abduction AROM >/= 140 degrees to improve ease with reaching onto overhead shelves. 3/13/2019: Right Shoulder Flexion 130 deg, Right Shoulder Abduction 100 deg  
Current: Progressing, Right Shoulder Flexion 145 deg, Right Shoulder Abduction 140 4/11/19 PLAN [x]  Upgrade activities as tolerated     [x]  Continue plan of care 
[]  Update interventions per flow sheet      
[]  Discharge due to:_ 
[]  Other:_   
 
Thierno Ferguson, PT 4/11/2019  9:42 AM 
 
Future Appointments Date Time Provider Andre Sibley 4/11/2019  5:00 PM Merlin, [address]  
4/15/2019  5:00 [facility] [address]

## 2019-04-15 ENCOUNTER — HOSPITAL ENCOUNTER (OUTPATIENT)
Dept: PHYSICAL THERAPY | Age: 59
Discharge: HOME | End: 2019-04-15
Payer: COMMERCIAL

## 2019-04-15 PROCEDURE — 97110 THERAPEUTIC EXERCISES: CPT

## 2019-04-15 NOTE — PROGRESS NOTES
PT DAILY TREATMENT NOTE 10-18 Patient Name: Mookie Etienne Date:4/15/2019 : 1960 [x]  Patient  Verified Payor: Nicole Argueta / Plan: Selena Santos RPN / Product Type: Commerical / In time:455  Out time:552 Total Treatment Time (min): 57 Visit #: 4 of 12 Medicare/BCBS Only Total Timed Codes (min):  47 1:1 Treatment Time:  47  
 
 
Treatment Area: Pain in right shoulder [M25.511] SUBJECTIVE Pain Level (0-10 scale): 1 Any medication changes, allergies to medications, adverse drug reactions, diagnosis change, or new procedure performed?: [x] No    [] Yes (see summary sheet for update) Subjective functional status/changes:   [] No changes reported Pt notes minimal pain just slight muscle soreness and stiffness. OBJECTIVE Modality rationale: decrease pain and increase tissue extensibility to improve the patients ability to increase ease with functional tasks Min Type Additional Details  
 [] Estim:  []Unatt       []IFC  []Premod []Other:  []w/ice   []w/heat Position: Location:  
 [] Estim: []Att    []TENS instruct  []NMES []Other:  []w/US   []w/ice   []w/heat Position: Location:  
 []  Traction: [] Cervical       []Lumbar 
                     [] Prone          []Supine []Intermittent   []Continuous Lbs: 
[] before manual 
[] after manual  
 []  Ultrasound: []Continuous   [] Pulsed []1MHz   []3MHz W/cm2: 
Location:  
 []  Iontophoresis with dexamethasone Location: [] Take home patch  
[] In clinic  
10 []  Ice     [x]  heat 
[]  Ice massage 
[]  Laser  
[]  Anodyne Position:reclined Location:  
 []  Laser with stim 
[]  Other:  Position: Location:  
 []  Vasopneumatic Device Pressure:       [] lo [] med [] hi  
Temperature: [] lo [] med [] hi  
[] Skin assessment post-treatment:  []intact []redness- no adverse reaction 
  []redness  adverse reaction: 47 min Therapeutic Exercise:  [x] See flow sheet :  
Rationale: increase ROM and increase strength to improve the patients ability to increase ease with ADLs With 
 [] TE 
 [] TA 
 [] neuro 
 [] other: Patient Education: [x] Review HEP [] Progressed/Changed HEP based on:  
[] positioning   [] body mechanics   [] transfers   [] heat/ice application   
[] other:   
 
  
 
Pain Level (0-10 scale) post treatment: 0 
ASSESSMENT/Changes in Function: Pt is being placed on 30 day hold following this treatment visit. Reviewed and educated pt on the importance on the continuation of HEP. Patient will continue to benefit from skilled PT services to modify and progress therapeutic interventions, address functional mobility deficits, address ROM deficits, address strength deficits, analyze and address soft tissue restrictions, analyze and cue movement patterns, analyze and modify body mechanics/ergonomics, assess and modify postural abnormalities and instruct in home and community integration to attain remaining goals. [x]  See Plan of Care 
[]  See progress note/recertification 
[]  See Discharge Summary Progress towards goals / Updated goals: 
Short Term Goals: To be accomplished in 1 weeks: 1.  Patient will become proficient in her HEP and will be compliant in performing that program. 
Evaluation:  Patient given a written/illustrated HEP. Current: Met, HEP performance reported as prescribed, 2/7/2019 
  
Long Term Goals: To be accomplished in 6 weeks: 1. Patient's pain level will be 1-2/10 with activity in order to improve patient's ability to perform normal ADLs. Evaluation:  Pain 0-8/10 At PN: Progressing, Pain Levels = 0/10 - 3/10, 4/11/19 2. Patient will demonstrate 0-170 flexion; 0-150 abduction; 0-70 IR; 0-70 ER AROM right shoulder to increase ease of ADLs. At PN Progressing, Flexion 0-145, Abduction 0-130,  IR (90 deg abd) 40 deg, ER (90 deg abd) 52 deg, 4/11/2019 Current: remains: flexion: 0-145, abduction: 0-130 IR at 90 deg =42 def, ER at 90 deg=50 deg 4/15/19 3. Patient will increase FOTO score to 70 to increase functional mobility. At PN: Met, [address] = 70, 4/11/2019 4. Patient will be able to lift and put her carry on luggage into an overhead bin on an airplane order to perform normal ADLs. Evaluation:  Unable to lift up her carryon and has to check her bags. Current:  Progressing, She can put her backpack up in the overhead bin now. Still checks her other carryon bag.  2/11/19. 
  
Updated Goals:  To be achieved in 4 weeks 1. Patient will demonstrate right shoulder flexion and abduction AROM >/= 140 degrees to improve ease with reaching onto overhead shelves. 3/13/2019: Right Shoulder Flexion 130 deg, Right Shoulder Abduction 100 deg  At PN: Progressing, Right Shoulder Flexion 145 deg, Right Shoulder Abduction 140 4/11/19 
  
 
PLAN [x]  Upgrade activities as tolerated     [x]  Continue plan of care 
[]  Update interventions per flow sheet      
[]  Discharge due to:_ 
[]  Other:_ Jose E Ashley 4/15/2019  4:58 PM 
 
Future Appointments Date Time Provider Andre Sibley 4/15/2019  5:00 PM [facility]

## 2019-05-16 NOTE — PROGRESS NOTES
[facility] [address] [facility] [place], [address] [place]  
[phone] fax Discharge Summary Patient name: Corona Zamora Start of Care: 2019 Referral source: Bakari Madrid,* : 1960 Medical/Treatment Diagnosis: Pain in right shoulder [M25.511] Payor: Chely Parker / Plan: Janice Almonte RPN / Product Type: Commerical /  Onset Date:2018 Prior Hospitalization: see medical history Provider#: [id number] Medications: Verified on Patient Summary List   
Comorbidities: Arthritis, HBP. 
 Prior Level of Function: Independent, traveled a lot for work. Visits from Start of Care: 21    Missed Visits: 0 Reporting Period : 2019 to 4/15/2019 Summary of Care: 
 
Short Term Goals: To be accomplished in 1 weeks: 1.  Patient will become proficient in her HEP and will be compliant in performing that program. 
Evaluation:  Patient given a written/illustrated HEP. At DC: Met, HEP performance reported as prescribed 
  
Long Term Goals: To be accomplished in 6 weeks: 1. Patient's pain level will be 1-2/10 with activity in order to improve patient's ability to perform normal ADLs. Evaluation:  Pain 0-8/10 At DC: Progressing, Pain Levels = 0/10 - 3/10 2. Patient will demonstrate 0-170 flexion; 0-150 abduction; 0-70 IR; 0-70 ER AROM right shoulder to increase ease of ADLs. Evaluation:  PROM flexion 0-138; abduction 0-90; IR 0-26; ER 0-20. At DC: Progressing, Flexion 0-145, Abduction 0-130,  IR (90 deg abd) 40 deg, ER (90 deg abd) 52 deg 3. Patient will increase FOTO score to 70 to increase functional mobility. Evaluation: FOTO = 58 At DC: Met, FOTO = 70 
4. Patient will be able to lift and put her carry on luggage into an overhead bin on an airplane order to perform normal ADLs. Evaluation:  Unable to lift up her carryon and has to check her bags. At DC:  Progressing, She can put her backpack up in the overhead bin now. Still checks her other carryon bag 
  
Updated Goals:  To be achieved in 4 weeks 1. Patient will demonstrate right shoulder flexion and abduction AROM >/= 140 degrees to improve ease with reaching onto overhead shelves. 3/13/2019: Right Shoulder Flexion 130 deg, Right Shoulder Abduction 100 deg  At DC: Progressing, Right Shoulder Flexion 145 deg, Right Shoulder Abduction 140 ASSESSMENT/RECOMMENDATIONS: 
 
At this time patient to be discharged in accordance with clinic 30 day policy with patient having been last seen within clinic 4/15/2019 at which time patient was placed on 30 day hold to ensure continued functional progress with completion of independent exercise regime. Patient without further contact with clinic within 30 day period. [x]Discontinue therapy: [x]Patient has reached or is progressing toward set goals []Patient is non-compliant or has abdicated 
    []Due to lack of appreciable progress towards set goals Chris Mckoy, PT 5/16/2019 1:02 PM

## 2025-07-01 NOTE — PROGRESS NOTES
Chief Complaint   Patient presents with    Consult      Obed Connors     PT DAILY TREATMENT NOTE 10-18 Patient Name: Amrit Romero Date:2019 : 1960 [x]  Patient  Verified Payor: Eduard Briscoe / Plan: [id number] [employer] PPO / Product Type: PPO / In time:4:57  Out time:6:00 Total Treatment Time (min): 63 Visit #: 5 of 18 Medicare/BCBS Only Total Timed Codes (min):  53 1:1 Treatment Time:  48 Treatment Area: Adhesive capsulitis of right shoulder [M75.01] SUBJECTIVE Pain Level (0-10 scale): 2/10 Any medication changes, allergies to medications, adverse drug reactions, diagnosis change, or new procedure performed?: [x] No    [] Yes (see summary sheet for update) Subjective functional status/changes:   [] No changes reported Patient continues to note pain in the right shoulder with decreased ROM. Most pain noted with IR. OBJECTIVE Modality rationale: decrease pain to improve the patients ability to increase ease of motion to improve function. Min Type Additional Details  
 [] Estim:  []Unatt       []IFC  []Premod []Other:  []w/ice   []w/heat Position: Location:  
 [] Estim: []Att    []TENS instruct  []NMES []Other:  []w/US   []w/ice   []w/heat Position: Location:  
 []  Traction: [] Cervical       []Lumbar 
                     [] Prone          []Supine []Intermittent   []Continuous Lbs: 
[] before manual 
[] after manual  
 []  Ultrasound: []Continuous   [] Pulsed []1MHz   []3MHz W/cm2: 
Location:  
 []  Iontophoresis with dexamethasone Location: [] Take home patch  
[] In clinic  
10 []  Ice     [x]  heat 
[]  Ice massage 
[]  Laser  
[]  Anodyne Position: sitting Location: right shoulder  
 []  Laser with stim 
[]  Other:  Position: Location:  
 []  Vasopneumatic Device Pressure:       [] lo [] med [] hi  
Temperature: [] lo [] med [] hi  
[] Skin assessment post-treatment:  []intact []redness- no adverse reaction 
  []redness  adverse reaction: 43 min Therapeutic Exercise:  [] See flow sheet :  
Rationale: increase ROM and increase strength to improve the patients ability to increase their functional activity level. 10 min Manual Therapy:  Grade III, IV GH and ST joint mobs to increase elevation and rotation. Rationale: increase ROM and increase tissue extensibility to increase ease of motion to improve function. With 
 [] TE 
 [] TA 
 [] neuro 
 [] other: Patient Education: [x] Review HEP [] Progressed/Changed HEP based on:  
[] positioning   [] body mechanics   [] transfers   [] heat/ice application   
[] other:   
 
Other Objective/Functional Measures: Capsular end feel to ROM with pain at end range. She has 0-145 degrees flexion, abduction 0-80 degrees with pain. Pain Level (0-10 scale) post treatment: 0/10 ASSESSMENT/Changes in Function: Patient with continued limitation in ROM with pain. Patient will continue to benefit from skilled PT services to modify and progress therapeutic interventions, address functional mobility deficits, address ROM deficits, address strength deficits, analyze and address soft tissue restrictions, analyze and cue movement patterns, analyze and modify body mechanics/ergonomics and assess and modify postural abnormalities to attain remaining goals. [x]  See Plan of Care 
[]  See progress note/recertification 
[]  See Discharge Summary Progress towards goals / Updated goals: 
Short Term Goals: To be accomplished in 1 weeks: 1.  Patient will become proficient in her HEP and will be compliant in performing that program. 
Evaluation:  Patient given a written/illustrated HEP. Current: Met, HEP performance reported as prescribed, 2/7/2019 
  
Long Term Goals: To be accomplished in 6 weeks: 1. Patient's pain level will be 1-2/10 with activity in order to improve patient's ability to perform normal ADLs. Evaluation:  Pain 0-8/10 Current:  0/10 - 8/10. 2/11/19. 2. Patient will demonstrate 0-170 flexion; 0-150 abduction; 0-70 IR; 0-70 ER AROM right shoulder to increase ease of ADLs. Evaluation:  PROM flexion 0-138; abduction 0-90; IR 0-26; ER 0-20. Current: Progressing, Flexion 0-145, Abduction 0-80. 3. Patient will increase FOTO score to 70 to increase functional mobility. Evaluation: FOTO = 58 
4. Patient will be able to lift and put her carry on luggage into an overhead bin on an airplane order to perform normal ADLs. Evaluation:  Unable to lift up her carryon and has to check her bags. Current:  She can put her backpack up in the overhead bin now. Still checks her other carryon bag.  19. PLAN [x]  Upgrade activities as tolerated     [x]  Continue plan of care 
[]  Update interventions per flow sheet      
[]  Discharge due to:_ 
[]  Other:_ Nacho Paige PT 2019  4:58 PM 
 
Future Appointments Date Time Provider Andre Mccraryi 2019  5:00 PM Catrachita James PT [facility]  
2019  5:00 PM S[facility]HIA [facility]  
2019  8:00 AM Baron Ekta MD [facility] 71  
2/15/2019  4:00 PM Meridee Apgar [facility]  
2019  5:00 PM Catrachita James PT [facility]  
2019  5:00 PM Kelsey Boyer N Catrina Key [facility]  
2019  4:00 PM Meridee Apgar [facility]  
2019  5:00 PM Lance Khan PT [facility]  
3/1/2019  4:00 PM Meridee Apgar [facility]  
3/4/2019  5:30 PM Catrachita James PT [facility]  
3/6/2019  5:30 PM Meridee Apgar [facility]  
3/8/2019  4:00 PM Meridee Apgar [facility]